# Patient Record
Sex: FEMALE | Race: WHITE | NOT HISPANIC OR LATINO | Employment: UNEMPLOYED | URBAN - NONMETROPOLITAN AREA
[De-identification: names, ages, dates, MRNs, and addresses within clinical notes are randomized per-mention and may not be internally consistent; named-entity substitution may affect disease eponyms.]

---

## 2017-01-04 ENCOUNTER — OUTSIDE FACILITY SERVICE (OUTPATIENT)
Dept: CARDIOLOGY | Facility: CLINIC | Age: 62
End: 2017-01-04

## 2017-01-04 ENCOUNTER — HOSPITAL ENCOUNTER (OUTPATIENT)
Dept: CARDIOLOGY | Facility: HOSPITAL | Age: 62
Discharge: HOME | End: 2017-01-04

## 2017-01-04 DIAGNOSIS — R06.02 SHORTNESS OF BREATH: ICD-10-CM

## 2017-01-04 DIAGNOSIS — R07.89 OTHER CHEST PAIN: ICD-10-CM

## 2017-01-04 DIAGNOSIS — I48.91 ATRIAL FIBRILLATION, UNSPECIFIED TYPE (HCC): ICD-10-CM

## 2017-01-04 DIAGNOSIS — I10 ESSENTIAL HYPERTENSION: ICD-10-CM

## 2017-01-04 LAB
MAXIMAL PREDICTED HEART RATE: 161 BPM
STRESS TARGET HR: 137 BPM

## 2017-01-04 PROCEDURE — 93018 CV STRESS TEST I&R ONLY: CPT | Performed by: INTERNAL MEDICINE

## 2017-01-04 PROCEDURE — A9500 TC99M SESTAMIBI: HCPCS | Performed by: INTERNAL MEDICINE

## 2017-01-04 PROCEDURE — 0 TECHNETIUM SESTAMIBI: Performed by: INTERNAL MEDICINE

## 2017-01-04 PROCEDURE — 93017 CV STRESS TEST TRACING ONLY: CPT

## 2017-01-04 PROCEDURE — 78452 HT MUSCLE IMAGE SPECT MULT: CPT

## 2017-01-04 PROCEDURE — 25010000002 REGADENOSON 0.4 MG/5ML SOLUTION: Performed by: INTERNAL MEDICINE

## 2017-01-04 PROCEDURE — 78452 HT MUSCLE IMAGE SPECT MULT: CPT | Performed by: INTERNAL MEDICINE

## 2017-01-04 RX ADMIN — Medication 1 DOSE: at 14:00

## 2017-01-04 RX ADMIN — REGADENOSON 0.4 MG: 0.08 INJECTION, SOLUTION INTRAVENOUS at 14:00

## 2017-01-25 ENCOUNTER — OFFICE VISIT (OUTPATIENT)
Dept: CARDIOLOGY | Facility: CLINIC | Age: 62
End: 2017-01-25

## 2017-01-25 VITALS
HEIGHT: 61 IN | HEART RATE: 85 BPM | DIASTOLIC BLOOD PRESSURE: 60 MMHG | WEIGHT: 227.6 LBS | SYSTOLIC BLOOD PRESSURE: 116 MMHG | BODY MASS INDEX: 42.97 KG/M2 | OXYGEN SATURATION: 97 %

## 2017-01-25 DIAGNOSIS — I48.0 PAROXYSMAL ATRIAL FIBRILLATION (HCC): Primary | ICD-10-CM

## 2017-01-25 DIAGNOSIS — I10 ESSENTIAL HYPERTENSION: ICD-10-CM

## 2017-01-25 DIAGNOSIS — R60.9 PERIPHERAL EDEMA: ICD-10-CM

## 2017-01-25 DIAGNOSIS — I27.20 MODERATE TO SEVERE PULMONARY HYPERTENSION (HCC): ICD-10-CM

## 2017-01-25 DIAGNOSIS — Z00.00 HEALTHCARE MAINTENANCE: ICD-10-CM

## 2017-01-25 PROCEDURE — 99213 OFFICE O/P EST LOW 20 MIN: CPT | Performed by: NURSE PRACTITIONER

## 2017-01-25 RX ORDER — FUROSEMIDE 40 MG/1
40 TABLET ORAL DAILY
Qty: 30 TABLET | Refills: 5 | Status: SHIPPED | OUTPATIENT
Start: 2017-01-25 | End: 2017-03-09 | Stop reason: SDUPTHER

## 2017-01-25 NOTE — PROGRESS NOTES
Subjective   Christina Holbrook is a 61 y.o. female     Chief Complaint   Patient presents with   • Follow-up     presents as a follow up       HPI    Problem List:    1. Persistent Afib (CHADS 2 - Eliquis)  2. Hypertension  2.1 Echo 5/3/16 - EF 60-65%; trace MR; mild TR; PA 58  2.2 Echo 10/6/16 - EF 65%; mild TR; PA 35-45  3. Pulmonary Hypertension  4. Chronic BLE Edema   5. H/O CHF   6. Chest Pain  6.1 Stress Test 5/3/16 - small fixed defect in mid anterolateral, apical lateral segments; extent of defect mild to mod; post stress EF 40%  6.2 Stress Test 1/4/17 - no ischemia; low risk  7. DEEPAK - CPAP     Patient is a 61-year-old female who presents today for a follow-up on test results.  She denies any chest pain, pressure, palpitations, fluttering, dizziness, presyncope, syncope, orthopnea or PND.  Edema is much better.  She says she does not get short of breath with her daily.  She says since she has been on CPAP she has felt so much better.  She has had some blood in urine at times.  Advised to follow-up with PCP.  She has not had any other signs of bleeding or cerebral ischemic events.     We went over stress test.     Current Outpatient Prescriptions   Medication Sig Dispense Refill   • apixaban (ELIQUIS) 5 MG tablet tablet Take 5 mg by mouth 2 (two) times a day.     • furosemide (LASIX) 40 MG tablet Take 1 tablet by mouth Daily. 30 tablet 5   • hydrochlorothiazide (HYDRODIURIL) 12.5 MG tablet Take 12.5 mg by mouth Daily.     • metoprolol tartrate (LOPRESSOR) 50 MG tablet Take 1 tablet by mouth Every 12 (Twelve) Hours. 60 tablet 5   • potassium chloride (K-DUR) 10 MEQ CR tablet Take 1 tablet by mouth Daily. 30 tablet 3   • spironolactone (ALDACTONE) 25 MG tablet Take 1 tablet by mouth Daily. 30 tablet 6   • tamsulosin (FLOMAX) 0.4 MG capsule 24 hr capsule Take 1 capsule by mouth Daily. 30 capsule 5     No current facility-administered medications for this visit.        ALLERGIES    Naproxen    Past Medical  "History   Diagnosis Date   • A-fib    • CHF (congestive heart failure)    • Diabetes mellitus    • Disease of thyroid gland    • DVT (deep venous thrombosis)      hx   • Pulmonary HTN      severe       Social History     Social History   • Marital status:      Spouse name: N/A   • Number of children: N/A   • Years of education: N/A     Occupational History   • Not on file.     Social History Main Topics   • Smoking status: Former Smoker     Packs/day: 1.00     Types: Cigarettes     Quit date: 1993   • Smokeless tobacco: Not on file   • Alcohol use No   • Drug use: No   • Sexual activity: Defer     Other Topics Concern   • Not on file     Social History Narrative       Family History   Problem Relation Age of Onset   • Heart disease Father    • Heart disease Sister    • Heart disease Brother        Review of Systems   Constitutional: Negative for diaphoresis and fatigue.   HENT: Negative for rhinorrhea and sneezing.    Eyes: Negative for visual disturbance (implants ).   Respiratory: Negative for chest tightness and shortness of breath.    Cardiovascular: Negative for chest pain and palpitations.   Gastrointestinal: Negative.  Negative for nausea and vomiting.   Endocrine: Negative.    Genitourinary: Positive for difficulty urinating.        Some blood in urine    Musculoskeletal: Positive for arthralgias. Negative for back pain and neck pain.   Skin: Negative.    Allergic/Immunologic: Negative.    Neurological: Negative for dizziness, syncope and light-headedness.   Hematological: Bruises/bleeds easily.   Psychiatric/Behavioral: Negative.        Objective   Visit Vitals   • /60 (BP Location: Left arm, Patient Position: Sitting)   • Pulse 85   • Ht 61\" (154.9 cm)   • Wt 227 lb 9.6 oz (103 kg)   • SpO2 97%   • BMI 43 kg/m2     Lab Results (most recent)     None        Physical Exam   Constitutional: She is oriented to person, place, and time. Vital signs are normal. She appears well-developed and " well-nourished. She is active and cooperative.   HENT:   Head: Normocephalic.   Mouth/Throat: Abnormal dentition (missing some teeth).   Eyes: Lids are normal.   Neck: Normal carotid pulses, no hepatojugular reflux and no JVD present. Carotid bruit is present (Lt bruit ).   Cardiovascular: Normal rate.  An irregularly irregular rhythm present.   Murmur heard.   Systolic (RUSB and LUSB 1-2/6) murmur is present   Pulses:       Radial pulses are 2+ on the right side, and 2+ on the left side.        Dorsalis pedis pulses are 2+ on the right side, and 1+ on the left side.        Posterior tibial pulses are 2+ on the right side, and 1+ on the left side.   Trace edema BLE.    Pulmonary/Chest: Effort normal and breath sounds normal.   Abdominal: Normal appearance.   Neurological: She is alert and oriented to person, place, and time.   Skin: Skin is warm, dry and intact.   Purple discoloration to finger tips    Psychiatric: She has a normal mood and affect. Her speech is normal and behavior is normal. Judgment and thought content normal. Cognition and memory are normal.       Procedure   Procedures         Assessment/Plan      Diagnosis Plan   1. Paroxysmal atrial fibrillation     2. Severe pulmonary hypertension noted on recent echo Doppler study with right ventricular systolic pressures of 58mmHg.      3. Essential hypertension  Basic Metabolic Panel   4. Peripheral edema  furosemide (LASIX) 40 MG tablet    Basic Metabolic Panel   5. Healthcare maintenance  Basic Metabolic Panel       Return in about 6 weeks (around 3/8/2017).    Patient is doing much better from a cardiac standpoint.  She will decrease her Lasix to 40 mg and continue her medication regimen otherwise.  She will get a BMP in 1 week.  She will follow-up in 6 weeks or sooner if any changes.  She will follow-up with PCP if she notices blood in her urine.

## 2017-01-25 NOTE — PATIENT INSTRUCTIONS
Edema  Edema is an abnormal buildup of fluids in your body tissues. Edema is somewhat dependent on gravity to pull the fluid to the lowest place in your body. That makes the condition more common in the legs and thighs (lower extremities). Painless swelling of the feet and ankles is common and becomes more likely as you get older. It is also common in looser tissues, like around your eyes.   When the affected area is squeezed, the fluid may move out of that spot and leave a dent for a few moments. This dent is called pitting.   CAUSES   There are many possible causes of edema. Eating too much salt and being on your feet or sitting for a long time can cause edema in your legs and ankles. Hot weather may make edema worse. Common medical causes of edema include:  · Heart failure.  · Liver disease.  · Kidney disease.  · Weak blood vessels in your legs.  · Cancer.  · An injury.  · Pregnancy.  · Some medications.  · Obesity.   SYMPTOMS   Edema is usually painless. Your skin may look swollen or shiny.   DIAGNOSIS   Your health care provider may be able to diagnose edema by asking about your medical history and doing a physical exam. You may need to have tests such as X-rays, an electrocardiogram, or blood tests to check for medical conditions that may cause edema.   TREATMENT   Edema treatment depends on the cause. If you have heart, liver, or kidney disease, you need the treatment appropriate for these conditions. General treatment may include:  · Elevation of the affected body part above the level of your heart.  · Compression of the affected body part. Pressure from elastic bandages or support stockings squeezes the tissues and forces fluid back into the blood vessels. This keeps fluid from entering the tissues.  · Restriction of fluid and salt intake.  · Use of a water pill (diuretic). These medications are appropriate only for some types of edema. They pull fluid out of your body and make you urinate more often. This  gets rid of fluid and reduces swelling, but diuretics can have side effects. Only use diuretics as directed by your health care provider.  HOME CARE INSTRUCTIONS   · Keep the affected body part above the level of your heart when you are lying down.    · Do not sit still or stand for prolonged periods.    · Do not put anything directly under your knees when lying down.  · Do not wear constricting clothing or garters on your upper legs.    · Exercise your legs to work the fluid back into your blood vessels. This may help the swelling go down.    · Wear elastic bandages or support stockings to reduce ankle swelling as directed by your health care provider.    · Eat a low-salt diet to reduce fluid if your health care provider recommends it.    · Only take medicines as directed by your health care provider.   SEEK MEDICAL CARE IF:   · Your edema is not responding to treatment.  · You have heart, liver, or kidney disease and notice symptoms of edema.  · You have edema in your legs that does not improve after elevating them.    · You have sudden and unexplained weight gain.  SEEK IMMEDIATE MEDICAL CARE IF:   · You develop shortness of breath or chest pain.    · You cannot breathe when you lie down.  · You develop pain, redness, or warmth in the swollen areas.    · You have heart, liver, or kidney disease and suddenly get edema.  · You have a fever and your symptoms suddenly get worse.  MAKE SURE YOU:   · Understand these instructions.  · Will watch your condition.  · Will get help right away if you are not doing well or get worse.     This information is not intended to replace advice given to you by your health care provider. Make sure you discuss any questions you have with your health care provider.     Document Released: 12/18/2006 Document Revised: 01/08/2016 Document Reviewed: 10/10/2014  [employer] Interactive Patient Education ©2016 [employer]  Atrial Fibrillation  Atrial fibrillation is a type of irregular or  rapid heartbeat (arrhythmia). In atrial fibrillation, the heart quivers continuously in a chaotic pattern. This occurs when parts of the heart receive disorganized signals that make the heart unable to pump blood normally. This can increase the risk for stroke, heart failure, and other heart-related conditions. There are different types of atrial fibrillation, including:  · Paroxysmal atrial fibrillation. This type starts suddenly, and it usually stops on its own shortly after it starts.  · Persistent atrial fibrillation. This type often lasts longer than a week. It may stop on its own or with treatment.  · Long-lasting persistent atrial fibrillation. This type lasts longer than 12 months.  · Permanent atrial fibrillation. This type does not go away.  Talk with your health care provider to learn about the type of atrial fibrillation that you have.  CAUSES  This condition is caused by some heart-related conditions or procedures, including:  · A heart attack.  · Coronary artery disease.  · Heart failure.  · Heart valve conditions.  · High blood pressure.  · Inflammation of the sac that surrounds the heart (pericarditis).  · Heart surgery.  · Certain heart rhythm disorders, such as Parrish-Parkinson-White syndrome.  Other causes include:  · Pneumonia.  · Obstructive sleep apnea.  · Blockage of an artery in the lungs (pulmonary embolism, or PE).  · Lung cancer.  · Chronic lung disease.  · Thyroid problems, especially if the thyroid is overactive (hyperthyroidism).  · Caffeine.  · Excessive alcohol use or illegal drug use.  · Use of some medicines, including certain decongestants and diet pills.  Sometimes, the cause cannot be found.  RISK FACTORS  This condition is more likely to develop in:  · People who are older in age.  · People who smoke.  · People who have diabetes mellitus.  · People who are overweight (obese).  · Athletes who exercise vigorously.  SYMPTOMS  Symptoms of this condition include:  · A feeling that your  heart is beating rapidly or irregularly.  · A feeling of discomfort or pain in your chest.  · Shortness of breath.  · Sudden light-headedness or weakness.  · Getting tired easily during exercise.  In some cases, there are no symptoms.  DIAGNOSIS  Your health care provider may be able to detect atrial fibrillation when taking your pulse. If detected, this condition may be diagnosed with:  · An electrocardiogram (ECG).  · A Holter monitor test that records your heartbeat patterns over a 24-hour period.  · Transthoracic echocardiogram (TTE) to evaluate how blood flows through your heart.  · Transesophageal echocardiogram (LEONILA) to view more detailed images of your heart.  · A stress test.  · Imaging tests, such as a CT scan or chest X-ray.  · Blood tests.  TREATMENT  The main goals of treatment are to prevent blood clots from forming and to keep your heart beating at a normal rate and rhythm. The type of treatment that you receive depends on many factors, such as your underlying medical conditions and how you feel when you are experiencing atrial fibrillation.  This condition may be treated with:  · Medicine to slow down the heart rate, bring the heart's rhythm back to normal, or prevent clots from forming.  · Electrical cardioversion. This is a procedure that resets your heart's rhythm by delivering a controlled, low-energy shock to the heart through your skin.  · Different types of ablation, such as catheter ablation, catheter ablation with pacemaker, or surgical ablation. These procedures destroy the heart tissues that send abnormal signals. When the pacemaker is used, it is placed under your skin to help your heart beat in a regular rhythm.  HOME CARE INSTRUCTIONS  · Take over-the counter and prescription medicines only as told by your health care provider.  · If your health care provider prescribed a blood-thinning medicine (anticoagulant), take it exactly as told. Taking too much blood-thinning medicine can cause  bleeding. If you do not take enough blood-thinning medicine, you will not have the protection that you need against stroke and other problems.  · Do not use tobacco products, including cigarettes, chewing tobacco, and e-cigarettes. If you need help quitting, ask your health care provider.  · If you have obstructive sleep apnea, manage your condition as told by your health care provider.  · Do not drink alcohol.  · Do not drink beverages that contain caffeine, such as coffee, soda, and tea.  · Maintain a healthy weight. Do not use diet pills unless your health care provider approves. Diet pills may make heart problems worse.  · Follow diet instructions as told by your health care provider.  · Exercise regularly as told by your health care provider.  · Keep all follow-up visits as told by your health care provider. This is important.  PREVENTION  · Avoid drinking beverages that contain caffeine or alcohol.  · Avoid certain medicines, especially medicines that are used for breathing problems.  · Avoid certain herbs and herbal medicines, such as those that contain ephedra or ginseng.  · Do not use illegal drugs, such as cocaine and amphetamines.  · Do not smoke.  · Manage your high blood pressure.  SEEK MEDICAL CARE IF:  · You notice a change in the rate, rhythm, or strength of your heartbeat.  · You are taking an anticoagulant and you notice increased bruising.  · You tire more easily when you exercise or exert yourself.  SEEK IMMEDIATE MEDICAL CARE IF:  · You have chest pain, abdominal pain, sweating, or weakness.  · You feel nauseous.  · You notice blood in your vomit, bowel movement, or urine.  · You have shortness of breath.  · You suddenly have swollen feet and ankles.  · You feel dizzy.  · You have sudden weakness or numbness of the face, arm, or leg, especially on one side of the body.  · You have trouble speaking, trouble understanding, or both (aphasia).  · Your face or your eyelid droops on one side.  These  symptoms may represent a serious problem that is an emergency. Do not wait to see if the symptoms will go away. Get medical help right away. Call your local emergency services (911 in the [place] Do not drive yourself to the hospital.     This information is not intended to replace advice given to you by your health care provider. Make sure you discuss any questions you have with your health care provider.     Document Released: 12/18/2006 Document Revised: 09/07/2016 Document Reviewed: 04/13/2016  [employer] Interactive Patient Education ©2016 [employer]  Heart-Healthy Eating Plan  Many factors influence your heart health, including eating and exercise habits. Heart (coronary) risk increases with abnormal blood fat (lipid) levels. Heart-healthy meal planning includes limiting unhealthy fats, increasing healthy fats, and making other small dietary changes. This includes maintaining a healthy body weight to help keep lipid levels within a normal range.  WHAT IS MY PLAN?   Your health care provider recommends that you:  · Get no more than _________% of the total calories in your daily diet from fat.  · Limit your intake of saturated fat to less than _________% of your total calories each day.  · Limit the amount of cholesterol in your diet to less than _________ mg per day.  WHAT TYPES OF FAT SHOULD I CHOOSE?  · Choose healthy fats more often. Choose monounsaturated and polyunsaturated fats, such as olive oil and canola oil, flaxseeds, walnuts, almonds, and seeds.  · Eat more omega-3 fats. Good choices include salmon, mackerel, sardines, tuna, flaxseed oil, and ground flaxseeds. Aim to eat fish at least two times each week.  · Limit saturated fats. Saturated fats are primarily found in animal products, such as meats, butter, and cream. Plant sources of saturated fats include palm oil, palm kernel oil, and coconut oil.  · Avoid foods with partially hydrogenated oils in them. These contain trans fats. Examples of foods  "that contain trans fats are stick margarine, some tub margarines, cookies, crackers, and other baked goods.  WHAT GENERAL GUIDELINES DO I NEED TO FOLLOW?  · Check food labels carefully to identify foods with trans fats or high amounts of saturated fat.  · Fill one half of your plate with vegetables and green salads. Eat 4-5 servings of vegetables per day. A serving of vegetables equals 1 cup of raw leafy vegetables, ½ cup of raw or cooked cut-up vegetables, or ½ cup of vegetable juice.  · Fill one fourth of your plate with whole grains. Look for the word \"whole\" as the first word in the ingredient list.  · Fill one fourth of your plate with lean protein foods.  · Eat 4-5 servings of fruit per day. A serving of fruit equals one medium whole fruit, ¼ cup of dried fruit, ½ cup of fresh, frozen, or canned fruit, or ½ cup of 100% fruit juice.  · Eat more foods that contain soluble fiber. Examples of foods that contain this type of fiber are apples, broccoli, carrots, beans, peas, and barley. Aim to get 20-30 g of fiber per day.  · Eat more home-cooked food and less restaurant, buffet, and fast food.  · Limit or avoid alcohol.  · Limit foods that are high in starch and sugar.  · Avoid fried foods.  · Cook foods by using methods other than frying. Baking, boiling, grilling, and broiling are all great options. Other fat-reducing suggestions include:    Removing the skin from poultry.    Removing all visible fats from meats.    Skimming the fat off of stews, soups, and gravies before serving them.    Steaming vegetables in water or broth.  · Lose weight if you are overweight. Losing just 5-10% of your initial body weight can help your overall health and prevent diseases such as diabetes and heart disease.  · Increase your consumption of nuts, legumes, and seeds to 4-5 servings per week. One serving of dried beans or legumes equals ½ cup after being cooked, one serving of nuts equals 1½ ounces, and one serving of seeds equals " ½ ounce or 1 tablespoon.  · You may need to monitor your salt (sodium) intake, especially if you have high blood pressure. Talk with your health care provider or dietitian to get more information about reducing sodium.  WHAT FOODS CAN I EAT?  Grains  Breads, including Romanian, white, dexter, wheat, raisin, rye, oatmeal, and Italian. Tortillas that are neither fried nor made with lard or trans fat. Low-fat rolls, including hotdog and hamburger buns and English muffins. Biscuits. Muffins. Waffles. Pancakes. Light popcorn. Whole-grain cereals. Flatbread. [place] toast. Pretzels. Breadsticks. Rusks. Low-fat snacks and crackers, including oyster, saltine, matzo, pastora, animal, and rye. Rice and pasta, including brown rice and those that are made with whole wheat.  Vegetables  All vegetables.  Fruits  All fruits, but limit coconut.  Meats and Other Protein Sources  Lean, well-trimmed beef, veal, pork, and lamb. Chicken and turkey without skin. All fish and shellfish. Wild duck, rabbit, pheasant, and venison. Egg whites or low-cholesterol egg substitutes. Dried beans, peas, lentils, and tofu. Seeds and most nuts.  Dairy  Low-fat or nonfat cheeses, including ricotta, string, and mozzarella. Skim or 1% milk that is liquid, powdered, or evaporated. Buttermilk that is made with low-fat milk. Nonfat or low-fat yogurt.  Beverages  Mineral water. Diet carbonated beverages.  Sweets and Desserts  Sherbets and fruit ices. Honey, jam, marmalade, jelly, and syrups. Meringues and gelatins. Pure sugar candy, such as hard candy, jelly beans, gumdrops, mints, marshmallows, and small amounts of dark chocolate. Gerardo food cake.  Eat all sweets and desserts in moderation.  Fats and Oils  Nonhydrogenated (trans-free) margarines. Vegetable oils, including soybean, sesame, sunflower, olive, peanut, safflower, corn, canola, and cottonseed. Salad dressings or mayonnaise that are made with a vegetable oil. Limit added fats and oils that you use for  cooking, baking, salads, and as spreads.  Other  Cocoa powder. Coffee and tea. All seasonings and condiments.  The items listed above may not be a complete list of recommended foods or beverages. Contact your dietitian for more options.  WHAT FOODS ARE NOT RECOMMENDED?  Grains  Breads that are made with saturated or trans fats, oils, or whole milk. Croissants. Butter rolls. Cheese breads. Sweet rolls. Donuts. Buttered popcorn. Chow mein noodles. High-fat crackers, such as cheese or butter crackers.  Meats and Other Protein Sources  Fatty meats, such as hotdogs, short ribs, sausage, spareribs, maldonado, ribeye roast or steak, and mutton. High-fat deli meats, such as salami and bologna. Caviar. Domestic duck and goose. Organ meats, such as kidney, liver, sweetbreads, brains, gizzard, chitterlings, and heart.  Dairy  Cream, sour cream, cream cheese, and creamed cottage cheese. Whole milk cheeses, including blue (chino), [place] Luis Enrique, Brie, Jason, American, Havarti, Swiss, cheddar, Camembert, and Gilbert.  Whole or 2% milk that is liquid, evaporated, or condensed. Whole buttermilk. Cream sauce or high-fat cheese sauce. Yogurt that is made from whole milk.  Beverages  Regular sodas and drinks with added sugar.  Sweets and Desserts  Frosting. Pudding. Cookies. Cakes other than gena food cake. Candy that has milk chocolate or white chocolate, hydrogenated fat, butter, coconut, or unknown ingredients. Buttered syrups. Full-fat ice cream or ice cream drinks.  Fats and Oils  Gravy that has suet, meat fat, or shortening. Cocoa butter, hydrogenated oils, palm oil, coconut oil, palm kernel oil. These can often be found in baked products, candy, fried foods, nondairy creamers, and whipped toppings. Solid fats and shortenings, including maldonado fat, salt pork, lard, and butter. Nondairy cream substitutes, such as coffee creamers and sour cream substitutes. Salad dressings that are made of unknown oils, cheese, or sour cream.  The  items listed above may not be a complete list of foods and beverages to avoid. Contact your dietitian for more information.     This information is not intended to replace advice given to you by your health care provider. Make sure you discuss any questions you have with your health care provider.     Document Released: 09/26/2009 Document Revised: 01/08/2016 Document Reviewed: 06/11/2015  [employer] Interactive Patient Education ©2016 [employer]  Fat and Cholesterol Restricted Diet  High levels of fat and cholesterol in your blood may lead to various health problems, such as diseases of the heart, blood vessels, gallbladder, liver, and pancreas. Fats are concentrated sources of energy that come in various forms. Certain types of fat, including saturated fat, may be harmful in excess. Cholesterol is a substance needed by your body in small amounts. Your body makes all the cholesterol it needs. Excess cholesterol comes from the food you eat.  When you have high levels of cholesterol and saturated fat in your blood, health problems can develop because the excess fat and cholesterol will gather along the walls of your blood vessels, causing them to narrow. Choosing the right foods will help you control your intake of fat and cholesterol. This will help keep the levels of these substances in your blood within normal limits and reduce your risk of disease.  WHAT IS MY PLAN?  Your health care provider recommends that you:  · Get no more than __________ % of the total calories in your daily diet from fat.  · Limit your intake of saturated fat to less than ______% of your total calories each day.  · Limit the amount of cholesterol in your diet to less than _________mg per day.  WHAT TYPES OF FAT SHOULD I CHOOSE?  · Choose healthy fats more often. Choose monounsaturated and polyunsaturated fats, such as olive and canola oil, flaxseeds, walnuts, almonds, and seeds.  · Eat more omega-3 fats. Good choices include salmon,  "mackerel, sardines, tuna, flaxseed oil, and ground flaxseeds. Aim to eat fish at least two times a week.  · Limit saturated fats. Saturated fats are primarily found in animal products, such as meats, butter, and cream. Plant sources of saturated fats include palm oil, palm kernel oil, and coconut oil.  · Avoid foods with partially hydrogenated oils in them. These contain trans fats. Examples of foods that contain trans fats are stick margarine, some tub margarines, cookies, crackers, and other baked goods.  WHAT GENERAL GUIDELINES DO I NEED TO FOLLOW?  These guidelines for healthy eating will help you control your intake of fat and cholesterol:  · Check food labels carefully to identify foods with trans fats or high amounts of saturated fat.  · Fill one half of your plate with vegetables and green salads.  · Fill one fourth of your plate with whole grains. Look for the word \"whole\" as the first word in the ingredient list.  · Fill one fourth of your plate with lean protein foods.  · Limit fruit to two servings a day. Choose fruit instead of juice.  · Eat more foods that contain soluble fiber. Examples of foods that contain this type of fiber are apples, broccoli, carrots, beans, peas, and barley. Aim to get 20-30 g of fiber per day.  · Eat more home-cooked food and less restaurant, buffet, and fast food.  · Limit or avoid alcohol.  · Limit foods high in starch and sugar.  · Limit fried foods.  · Cook foods using methods other than frying. Baking, boiling, grilling, and broiling are all great options.  · Lose weight if you are overweight. Losing just 5-10% of your initial body weight can help your overall health and prevent diseases such as diabetes and heart disease.  WHAT FOODS CAN I EAT?  Grains  Whole grains, such as whole wheat or whole grain breads, crackers, cereals, and pasta. Unsweetened oatmeal, bulgur, barley, quinoa, or brown rice. Corn or whole wheat flour tortillas.  Vegetables  Fresh or frozen " vegetables (raw, steamed, roasted, or grilled). Green salads.  Fruits  All fresh, canned (in natural juice), or frozen fruits.  Meat and Other Protein Products  Ground beef (85% or leaner), grass-fed beef, or beef trimmed of fat. Skinless chicken or turkey. Ground chicken or turkey. Pork trimmed of fat. All fish and seafood. Eggs. Dried beans, peas, or lentils. Unsalted nuts or seeds. Unsalted canned or dry beans.  Dairy  Low-fat dairy products, such as skim or 1% milk, 2% or reduced-fat cheeses, low-fat ricotta or cottage cheese, or plain low-fat yogurt.  Fats and Oils  Tub margarines without trans fats. Light or reduced-fat mayonnaise and salad dressings. Avocado. Olive, canola, sesame, or safflower oils. Natural peanut or almond butter (choose ones without added sugar and oil).  The items listed above may not be a complete list of recommended foods or beverages. Contact your dietitian for more options.  WHAT FOODS ARE NOT RECOMMENDED?  Grains  White bread. White pasta. White rice. Cornbread. Bagels, pastries, and croissants. Crackers that contain trans fat.  Vegetables  White potatoes. Corn. Creamed or fried vegetables. Vegetables in a cheese sauce.  Fruits  Dried fruits. Canned fruit in light or heavy syrup. Fruit juice.  Meat and Other Protein Products  Fatty cuts of meat. Ribs, chicken wings, maldonado, sausage, bologna, salami, chitterlings, fatback, hot dogs, bratwurst, and packaged luncheon meats. Liver and organ meats.  Dairy  Whole or 2% milk, cream, half-and-half, and cream cheese. Whole milk cheeses. Whole-fat or sweetened yogurt. Full-fat cheeses. Nondairy creamers and whipped toppings. Processed cheese, cheese spreads, or cheese curds.  Sweets and Desserts  Corn syrup, sugars, honey, and molasses. Candy. Jam and jelly. Syrup. Sweetened cereals. Cookies, pies, cakes, donuts, muffins, and ice cream.  Fats and Oils  Butter, stick margarine, lard, shortening, ghee, or maldonado fat. Coconut, palm kernel, or palm  oils.  Beverages  Alcohol. Sweetened drinks (such as sodas, lemonade, and fruit drinks or punches).  The items listed above may not be a complete list of foods and beverages to avoid. Contact your dietitian for more information.     This information is not intended to replace advice given to you by your health care provider. Make sure you discuss any questions you have with your health care provider.     Document Released: 12/18/2006 Document Revised: 01/08/2016 Document Reviewed: 03/18/2015  [employer] Interactive Patient Education ©2016 [email or web address]

## 2017-01-25 NOTE — MR AVS SNAPSHOT
Christina A Sheron   1/25/2017 8:30 AM   Office Visit    Dept Phone:  [phone]   Encounter #:  [id number]    Provider:  CACHORRO Perera   Department:  [facility] CARDIOLOGY                Your Full Care Plan              Today's Medication Changes          These changes are accurate as of: 1/25/17  8:56 AM.  If you have any questions, ask your nurse or doctor.               Medication(s)that have changed:     furosemide 40 MG tablet   Commonly known as:  LASIX   Take 1 tablet by mouth Daily.   What changed:    - medication strength  - how much to take   Changed by:  CACHORRO Perera         Stop taking medication(s)listed here:     fluticasone 50 MCG/ACT nasal spray   Commonly known as:  FLONASE   Stopped by:  CACHORRO Perera                Where to Get Your Medications      These medications were sent to [facility] [address] [place] [address] - [address] [place] [address] [phone] [facility] [phone] [address] [address]     Phone:  [phone]     furosemide 40 MG tablet                  Your Updated Medication List          This list is accurate as of: 1/25/17  8:56 AM.  Always use your most recent med list.                apixaban 5 MG tablet tablet   Commonly known as:  ELIQUIS       furosemide 40 MG tablet   Commonly known as:  LASIX   Take 1 tablet by mouth Daily.       hydrochlorothiazide 12.5 MG tablet   Commonly known as:  HYDRODIURIL       metoprolol tartrate 50 MG tablet   Commonly known as:  LOPRESSOR   Take 1 tablet by mouth Every 12 (Twelve) Hours.       potassium chloride 10 MEQ CR tablet   Commonly known as:  K-DUR   Take 1 tablet by mouth Daily.       spironolactone 25 MG tablet   Commonly known as:  ALDACTONE   Take 1 tablet by mouth Daily.       tamsulosin 0.4 MG capsule 24 hr capsule   Commonly known as:  FLOMAX   Take 1 capsule by mouth Daily.               You Were Diagnosed With        Codes Comments    Paroxysmal atrial fibrillation    -  Primary ICD-10-CM: I48.0  ICD-9-CM: 427.31     Moderate to severe pulmonary hypertension     ICD-10-CM: I27.2  ICD-9-CM: 416.8     Essential hypertension     ICD-10-CM: I10  ICD-9-CM: 401.9     Peripheral edema     ICD-10-CM: R60.9  ICD-9-CM: 782.3     Healthcare maintenance     ICD-10-CM: Z00.00  ICD-9-CM: V70.0       Instructions    Edema  Edema is an abnormal buildup of fluids in your body tissues. Edema is somewhat dependent on gravity to pull the fluid to the lowest place in your body. That makes the condition more common in the legs and thighs (lower extremities). Painless swelling of the feet and ankles is common and becomes more likely as you get older. It is also common in looser tissues, like around your eyes.   When the affected area is squeezed, the fluid may move out of that spot and leave a dent for a few moments. This dent is called pitting.   CAUSES   There are many possible causes of edema. Eating too much salt and being on your feet or sitting for a long time can cause edema in your legs and ankles. Hot weather may make edema worse. Common medical causes of edema include:  · Heart failure.  · Liver disease.  · Kidney disease.  · Weak blood vessels in your legs.  · Cancer.  · An injury.  · Pregnancy.  · Some medications.  · Obesity.   SYMPTOMS   Edema is usually painless. Your skin may look swollen or shiny.   DIAGNOSIS   Your health care provider may be able to diagnose edema by asking about your medical history and doing a physical exam. You may need to have tests such as X-rays, an electrocardiogram, or blood tests to check for medical conditions that may cause edema.   TREATMENT   Edema treatment depends on the cause. If you have heart, liver, or kidney disease, you need the treatment appropriate for these conditions. General treatment may include:  · Elevation of the affected body part above the level of your heart.  · Compression of the affected body part.  Pressure from elastic bandages or support stockings squeezes the tissues and forces fluid back into the blood vessels. This keeps fluid from entering the tissues.  · Restriction of fluid and salt intake.  · Use of a water pill (diuretic). These medications are appropriate only for some types of edema. They pull fluid out of your body and make you urinate more often. This gets rid of fluid and reduces swelling, but diuretics can have side effects. Only use diuretics as directed by your health care provider.  HOME CARE INSTRUCTIONS   · Keep the affected body part above the level of your heart when you are lying down.    · Do not sit still or stand for prolonged periods.    · Do not put anything directly under your knees when lying down.  · Do not wear constricting clothing or garters on your upper legs.    · Exercise your legs to work the fluid back into your blood vessels. This may help the swelling go down.    · Wear elastic bandages or support stockings to reduce ankle swelling as directed by your health care provider.    · Eat a low-salt diet to reduce fluid if your health care provider recommends it.    · Only take medicines as directed by your health care provider.   SEEK MEDICAL CARE IF:   · Your edema is not responding to treatment.  · You have heart, liver, or kidney disease and notice symptoms of edema.  · You have edema in your legs that does not improve after elevating them.    · You have sudden and unexplained weight gain.  SEEK IMMEDIATE MEDICAL CARE IF:   · You develop shortness of breath or chest pain.    · You cannot breathe when you lie down.  · You develop pain, redness, or warmth in the swollen areas.    · You have heart, liver, or kidney disease and suddenly get edema.  · You have a fever and your symptoms suddenly get worse.  MAKE SURE YOU:   · Understand these instructions.  · Will watch your condition.  · Will get help right away if you are not doing well or get worse.     This information is  not intended to replace advice given to you by your health care provider. Make sure you discuss any questions you have with your health care provider.     Document Released: 12/18/2006 Document Revised: 01/08/2016 Document Reviewed: 10/10/2014  [employer] Interactive Patient Education ©2016 [employer]  Atrial Fibrillation  Atrial fibrillation is a type of irregular or rapid heartbeat (arrhythmia). In atrial fibrillation, the heart quivers continuously in a chaotic pattern. This occurs when parts of the heart receive disorganized signals that make the heart unable to pump blood normally. This can increase the risk for stroke, heart failure, and other heart-related conditions. There are different types of atrial fibrillation, including:  · Paroxysmal atrial fibrillation. This type starts suddenly, and it usually stops on its own shortly after it starts.  · Persistent atrial fibrillation. This type often lasts longer than a week. It may stop on its own or with treatment.  · Long-lasting persistent atrial fibrillation. This type lasts longer than 12 months.  · Permanent atrial fibrillation. This type does not go away.  Talk with your health care provider to learn about the type of atrial fibrillation that you have.  CAUSES  This condition is caused by some heart-related conditions or procedures, including:  · A heart attack.  · Coronary artery disease.  · Heart failure.  · Heart valve conditions.  · High blood pressure.  · Inflammation of the sac that surrounds the heart (pericarditis).  · Heart surgery.  · Certain heart rhythm disorders, such as Parrish-Parkinson-White syndrome.  Other causes include:  · Pneumonia.  · Obstructive sleep apnea.  · Blockage of an artery in the lungs (pulmonary embolism, or PE).  · Lung cancer.  · Chronic lung disease.  · Thyroid problems, especially if the thyroid is overactive (hyperthyroidism).  · Caffeine.  · Excessive alcohol use or illegal drug use.  · Use of some medicines, including  certain decongestants and diet pills.  Sometimes, the cause cannot be found.  RISK FACTORS  This condition is more likely to develop in:  · People who are older in age.  · People who smoke.  · People who have diabetes mellitus.  · People who are overweight (obese).  · Athletes who exercise vigorously.  SYMPTOMS  Symptoms of this condition include:  · A feeling that your heart is beating rapidly or irregularly.  · A feeling of discomfort or pain in your chest.  · Shortness of breath.  · Sudden light-headedness or weakness.  · Getting tired easily during exercise.  In some cases, there are no symptoms.  DIAGNOSIS  Your health care provider may be able to detect atrial fibrillation when taking your pulse. If detected, this condition may be diagnosed with:  · An electrocardiogram (ECG).  · A Holter monitor test that records your heartbeat patterns over a 24-hour period.  · Transthoracic echocardiogram (TTE) to evaluate how blood flows through your heart.  · Transesophageal echocardiogram (LEONILA) to view more detailed images of your heart.  · A stress test.  · Imaging tests, such as a CT scan or chest X-ray.  · Blood tests.  TREATMENT  The main goals of treatment are to prevent blood clots from forming and to keep your heart beating at a normal rate and rhythm. The type of treatment that you receive depends on many factors, such as your underlying medical conditions and how you feel when you are experiencing atrial fibrillation.  This condition may be treated with:  · Medicine to slow down the heart rate, bring the heart's rhythm back to normal, or prevent clots from forming.  · Electrical cardioversion. This is a procedure that resets your heart's rhythm by delivering a controlled, low-energy shock to the heart through your skin.  · Different types of ablation, such as catheter ablation, catheter ablation with pacemaker, or surgical ablation. These procedures destroy the heart tissues that send abnormal signals. When the  pacemaker is used, it is placed under your skin to help your heart beat in a regular rhythm.  HOME CARE INSTRUCTIONS  · Take over-the counter and prescription medicines only as told by your health care provider.  · If your health care provider prescribed a blood-thinning medicine (anticoagulant), take it exactly as told. Taking too much blood-thinning medicine can cause bleeding. If you do not take enough blood-thinning medicine, you will not have the protection that you need against stroke and other problems.  · Do not use tobacco products, including cigarettes, chewing tobacco, and e-cigarettes. If you need help quitting, ask your health care provider.  · If you have obstructive sleep apnea, manage your condition as told by your health care provider.  · Do not drink alcohol.  · Do not drink beverages that contain caffeine, such as coffee, soda, and tea.  · Maintain a healthy weight. Do not use diet pills unless your health care provider approves. Diet pills may make heart problems worse.  · Follow diet instructions as told by your health care provider.  · Exercise regularly as told by your health care provider.  · Keep all follow-up visits as told by your health care provider. This is important.  PREVENTION  · Avoid drinking beverages that contain caffeine or alcohol.  · Avoid certain medicines, especially medicines that are used for breathing problems.  · Avoid certain herbs and herbal medicines, such as those that contain ephedra or ginseng.  · Do not use illegal drugs, such as cocaine and amphetamines.  · Do not smoke.  · Manage your high blood pressure.  SEEK MEDICAL CARE IF:  · You notice a change in the rate, rhythm, or strength of your heartbeat.  · You are taking an anticoagulant and you notice increased bruising.  · You tire more easily when you exercise or exert yourself.  SEEK IMMEDIATE MEDICAL CARE IF:  · You have chest pain, abdominal pain, sweating, or weakness.  · You feel nauseous.  · You notice  blood in your vomit, bowel movement, or urine.  · You have shortness of breath.  · You suddenly have swollen feet and ankles.  · You feel dizzy.  · You have sudden weakness or numbness of the face, arm, or leg, especially on one side of the body.  · You have trouble speaking, trouble understanding, or both (aphasia).  · Your face or your eyelid droops on one side.  These symptoms may represent a serious problem that is an emergency. Do not wait to see if the symptoms will go away. Get medical help right away. Call your local emergency services (911 in the [place] Do not drive yourself to the hospital.     This information is not intended to replace advice given to you by your health care provider. Make sure you discuss any questions you have with your health care provider.     Document Released: 12/18/2006 Document Revised: 09/07/2016 Document Reviewed: 04/13/2016  [employer] Interactive Patient Education ©2016 [employer]  Heart-Healthy Eating Plan  Many factors influence your heart health, including eating and exercise habits. Heart (coronary) risk increases with abnormal blood fat (lipid) levels. Heart-healthy meal planning includes limiting unhealthy fats, increasing healthy fats, and making other small dietary changes. This includes maintaining a healthy body weight to help keep lipid levels within a normal range.  WHAT IS MY PLAN?   Your health care provider recommends that you:  · Get no more than _________% of the total calories in your daily diet from fat.  · Limit your intake of saturated fat to less than _________% of your total calories each day.  · Limit the amount of cholesterol in your diet to less than _________ mg per day.  WHAT TYPES OF FAT SHOULD I CHOOSE?  · Choose healthy fats more often. Choose monounsaturated and polyunsaturated fats, such as olive oil and canola oil, flaxseeds, walnuts, almonds, and seeds.  · Eat more omega-3 fats. Good choices include salmon, mackerel, sardines, tuna,  "flaxseed oil, and ground flaxseeds. Aim to eat fish at least two times each week.  · Limit saturated fats. Saturated fats are primarily found in animal products, such as meats, butter, and cream. Plant sources of saturated fats include palm oil, palm kernel oil, and coconut oil.  · Avoid foods with partially hydrogenated oils in them. These contain trans fats. Examples of foods that contain trans fats are stick margarine, some tub margarines, cookies, crackers, and other baked goods.  WHAT GENERAL GUIDELINES DO I NEED TO FOLLOW?  · Check food labels carefully to identify foods with trans fats or high amounts of saturated fat.  · Fill one half of your plate with vegetables and green salads. Eat 4-5 servings of vegetables per day. A serving of vegetables equals 1 cup of raw leafy vegetables, ½ cup of raw or cooked cut-up vegetables, or ½ cup of vegetable juice.  · Fill one fourth of your plate with whole grains. Look for the word \"whole\" as the first word in the ingredient list.  · Fill one fourth of your plate with lean protein foods.  · Eat 4-5 servings of fruit per day. A serving of fruit equals one medium whole fruit, ¼ cup of dried fruit, ½ cup of fresh, frozen, or canned fruit, or ½ cup of 100% fruit juice.  · Eat more foods that contain soluble fiber. Examples of foods that contain this type of fiber are apples, broccoli, carrots, beans, peas, and barley. Aim to get 20-30 g of fiber per day.  · Eat more home-cooked food and less restaurant, buffet, and fast food.  · Limit or avoid alcohol.  · Limit foods that are high in starch and sugar.  · Avoid fried foods.  · Cook foods by using methods other than frying. Baking, boiling, grilling, and broiling are all great options. Other fat-reducing suggestions include:    Removing the skin from poultry.    Removing all visible fats from meats.    Skimming the fat off of stews, soups, and gravies before serving them.    Steaming vegetables in water or broth.  · Lose " weight if you are overweight. Losing just 5-10% of your initial body weight can help your overall health and prevent diseases such as diabetes and heart disease.  · Increase your consumption of nuts, legumes, and seeds to 4-5 servings per week. One serving of dried beans or legumes equals ½ cup after being cooked, one serving of nuts equals 1½ ounces, and one serving of seeds equals ½ ounce or 1 tablespoon.  · You may need to monitor your salt (sodium) intake, especially if you have high blood pressure. Talk with your health care provider or dietitian to get more information about reducing sodium.  WHAT FOODS CAN I EAT?  Grains  Breads, including Jordanian, white, dexter, wheat, raisin, rye, oatmeal, and Italian. Tortillas that are neither fried nor made with lard or trans fat. Low-fat rolls, including hotdog and hamburger buns and English muffins. Biscuits. Muffins. Waffles. Pancakes. Light popcorn. Whole-grain cereals. Flatbread. [place] toast. Pretzels. Breadsticks. Rusks. Low-fat snacks and crackers, including oyster, saltine, matzo, pastora, animal, and rye. Rice and pasta, including brown rice and those that are made with whole wheat.  Vegetables  All vegetables.  Fruits  All fruits, but limit coconut.  Meats and Other Protein Sources  Lean, well-trimmed beef, veal, pork, and lamb. Chicken and turkey without skin. All fish and shellfish. Wild duck, rabbit, pheasant, and venison. Egg whites or low-cholesterol egg substitutes. Dried beans, peas, lentils, and tofu. Seeds and most nuts.  Dairy  Low-fat or nonfat cheeses, including ricotta, string, and mozzarella. Skim or 1% milk that is liquid, powdered, or evaporated. Buttermilk that is made with low-fat milk. Nonfat or low-fat yogurt.  Beverages  Mineral water. Diet carbonated beverages.  Sweets and Desserts  Sherbets and fruit ices. Honey, jam, marmalade, jelly, and syrups. Meringues and gelatins. Pure sugar candy, such as hard candy, jelly beans, gumdrops, mints,  marshmallows, and small amounts of dark chocolate. Gerardo food cake.  Eat all sweets and desserts in moderation.  Fats and Oils  Nonhydrogenated (trans-free) margarines. Vegetable oils, including soybean, sesame, sunflower, olive, peanut, safflower, corn, canola, and cottonseed. Salad dressings or mayonnaise that are made with a vegetable oil. Limit added fats and oils that you use for cooking, baking, salads, and as spreads.  Other  Cocoa powder. Coffee and tea. All seasonings and condiments.  The items listed above may not be a complete list of recommended foods or beverages. Contact your dietitian for more options.  WHAT FOODS ARE NOT RECOMMENDED?  Grains  Breads that are made with saturated or trans fats, oils, or whole milk. Croissants. Butter rolls. Cheese breads. Sweet rolls. Donuts. Buttered popcorn. Chow mein noodles. High-fat crackers, such as cheese or butter crackers.  Meats and Other Protein Sources  Fatty meats, such as hotdogs, short ribs, sausage, spareribs, maldonado, ribeye roast or steak, and mutton. High-fat deli meats, such as salami and bologna. Caviar. Domestic duck and goose. Organ meats, such as kidney, liver, sweetbreads, brains, gizzard, chitterlings, and heart.  Dairy  Cream, sour cream, cream cheese, and creamed cottage cheese. Whole milk cheeses, including blue (chino), [place] Luis Enrique, Brie, Jason, American, Havarti, Swiss, cheddar, Camembert, and Cody.  Whole or 2% milk that is liquid, evaporated, or condensed. Whole buttermilk. Cream sauce or high-fat cheese sauce. Yogurt that is made from whole milk.  Beverages  Regular sodas and drinks with added sugar.  Sweets and Desserts  Frosting. Pudding. Cookies. Cakes other than gerardo food cake. Candy that has milk chocolate or white chocolate, hydrogenated fat, butter, coconut, or unknown ingredients. Buttered syrups. Full-fat ice cream or ice cream drinks.  Fats and Oils  Gravy that has suet, meat fat, or shortening. Cocoa butter, hydrogenated  oils, palm oil, coconut oil, palm kernel oil. These can often be found in baked products, candy, fried foods, nondairy creamers, and whipped toppings. Solid fats and shortenings, including maldonado fat, salt pork, lard, and butter. Nondairy cream substitutes, such as coffee creamers and sour cream substitutes. Salad dressings that are made of unknown oils, cheese, or sour cream.  The items listed above may not be a complete list of foods and beverages to avoid. Contact your dietitian for more information.     This information is not intended to replace advice given to you by your health care provider. Make sure you discuss any questions you have with your health care provider.     Document Released: 09/26/2009 Document Revised: 01/08/2016 Document Reviewed: 06/11/2015  [employer] Interactive Patient Education ©2016 [employer]  Fat and Cholesterol Restricted Diet  High levels of fat and cholesterol in your blood may lead to various health problems, such as diseases of the heart, blood vessels, gallbladder, liver, and pancreas. Fats are concentrated sources of energy that come in various forms. Certain types of fat, including saturated fat, may be harmful in excess. Cholesterol is a substance needed by your body in small amounts. Your body makes all the cholesterol it needs. Excess cholesterol comes from the food you eat.  When you have high levels of cholesterol and saturated fat in your blood, health problems can develop because the excess fat and cholesterol will gather along the walls of your blood vessels, causing them to narrow. Choosing the right foods will help you control your intake of fat and cholesterol. This will help keep the levels of these substances in your blood within normal limits and reduce your risk of disease.  WHAT IS MY PLAN?  Your health care provider recommends that you:  · Get no more than __________ % of the total calories in your daily diet from fat.  · Limit your intake of saturated fat to  "less than ______% of your total calories each day.  · Limit the amount of cholesterol in your diet to less than _________mg per day.  WHAT TYPES OF FAT SHOULD I CHOOSE?  · Choose healthy fats more often. Choose monounsaturated and polyunsaturated fats, such as olive and canola oil, flaxseeds, walnuts, almonds, and seeds.  · Eat more omega-3 fats. Good choices include salmon, mackerel, sardines, tuna, flaxseed oil, and ground flaxseeds. Aim to eat fish at least two times a week.  · Limit saturated fats. Saturated fats are primarily found in animal products, such as meats, butter, and cream. Plant sources of saturated fats include palm oil, palm kernel oil, and coconut oil.  · Avoid foods with partially hydrogenated oils in them. These contain trans fats. Examples of foods that contain trans fats are stick margarine, some tub margarines, cookies, crackers, and other baked goods.  WHAT GENERAL GUIDELINES DO I NEED TO FOLLOW?  These guidelines for healthy eating will help you control your intake of fat and cholesterol:  · Check food labels carefully to identify foods with trans fats or high amounts of saturated fat.  · Fill one half of your plate with vegetables and green salads.  · Fill one fourth of your plate with whole grains. Look for the word \"whole\" as the first word in the ingredient list.  · Fill one fourth of your plate with lean protein foods.  · Limit fruit to two servings a day. Choose fruit instead of juice.  · Eat more foods that contain soluble fiber. Examples of foods that contain this type of fiber are apples, broccoli, carrots, beans, peas, and barley. Aim to get 20-30 g of fiber per day.  · Eat more home-cooked food and less restaurant, buffet, and fast food.  · Limit or avoid alcohol.  · Limit foods high in starch and sugar.  · Limit fried foods.  · Cook foods using methods other than frying. Baking, boiling, grilling, and broiling are all great options.  · Lose weight if you are overweight. Losing " just 5-10% of your initial body weight can help your overall health and prevent diseases such as diabetes and heart disease.  WHAT FOODS CAN I EAT?  Grains  Whole grains, such as whole wheat or whole grain breads, crackers, cereals, and pasta. Unsweetened oatmeal, bulgur, barley, quinoa, or brown rice. Corn or whole wheat flour tortillas.  Vegetables  Fresh or frozen vegetables (raw, steamed, roasted, or grilled). Green salads.  Fruits  All fresh, canned (in natural juice), or frozen fruits.  Meat and Other Protein Products  Ground beef (85% or leaner), grass-fed beef, or beef trimmed of fat. Skinless chicken or turkey. Ground chicken or turkey. Pork trimmed of fat. All fish and seafood. Eggs. Dried beans, peas, or lentils. Unsalted nuts or seeds. Unsalted canned or dry beans.  Dairy  Low-fat dairy products, such as skim or 1% milk, 2% or reduced-fat cheeses, low-fat ricotta or cottage cheese, or plain low-fat yogurt.  Fats and Oils  Tub margarines without trans fats. Light or reduced-fat mayonnaise and salad dressings. Avocado. Olive, canola, sesame, or safflower oils. Natural peanut or almond butter (choose ones without added sugar and oil).  The items listed above may not be a complete list of recommended foods or beverages. Contact your dietitian for more options.  WHAT FOODS ARE NOT RECOMMENDED?  Grains  White bread. White pasta. White rice. Cornbread. Bagels, pastries, and croissants. Crackers that contain trans fat.  Vegetables  White potatoes. Corn. Creamed or fried vegetables. Vegetables in a cheese sauce.  Fruits  Dried fruits. Canned fruit in light or heavy syrup. Fruit juice.  Meat and Other Protein Products  Fatty cuts of meat. Ribs, chicken wings, maldonado, sausage, bologna, salami, chitterlings, fatback, hot dogs, bratwurst, and packaged luncheon meats. Liver and organ meats.  Dairy  Whole or 2% milk, cream, half-and-half, and cream cheese. Whole milk cheeses. Whole-fat or sweetened yogurt. Full-fat  cheeses. Nondairy creamers and whipped toppings. Processed cheese, cheese spreads, or cheese curds.  Sweets and Desserts  Corn syrup, sugars, honey, and molasses. Candy. Jam and jelly. Syrup. Sweetened cereals. Cookies, pies, cakes, donuts, muffins, and ice cream.  Fats and Oils  Butter, stick margarine, lard, shortening, ghee, or maldonado fat. Coconut, palm kernel, or palm oils.  Beverages  Alcohol. Sweetened drinks (such as sodas, lemonade, and fruit drinks or punches).  The items listed above may not be a complete list of foods and beverages to avoid. Contact your dietitian for more information.     This information is not intended to replace advice given to you by your health care provider. Make sure you discuss any questions you have with your health care provider.     Document Released: 12/18/2006 Document Revised: 01/08/2016 Document Reviewed: 03/18/2015  [employer] Interactive Patient Education ©2016 [employer]       Patient Instructions History      Upcoming Appointments     Visit Type Date Time Department    OFFICE VISIT 1/25/2017  8:30 AM [facility]    FOLLOW UP 3/9/2017  8:30 AM [facility] Signup     Our records indicate that you have an active [employer] account.    You can view your After Visit Summary by going to [employer] and logging in with your [employer] username and password.  If you don't have a [employer] username and password but a parent or guardian has access to your record, the parent or guardian should login with their own [employer] username and password and access your record to view the After Visit Summary.    If you have questions, you can email [email or web address] or call [phone] to talk to our [employer] staff.  Remember, [employer] is NOT to be used for urgent needs.  For medical emergencies, dial 911.               Other Info from Your Visit           Your Appointments     Mar 09, 2017  8:30 AM EST   Follow Up with Jeana  "CACHORRO Huynh   [facility] CARDIOLOGY (--)    [address] [place] [id number] [phone]           Arrive 15 minutes prior to appointment.              Allergies     Naproxen        Reason for Visit     Follow-up presents as a follow up      Vital Signs     Blood Pressure Pulse Height Weight Oxygen Saturation Body Mass Index    116/60 (BP Location: Left arm, Patient Position: Sitting) 85 61\" (154.9 cm) 227 lb 9.6 oz (103 kg) 97% 43 kg/m2    Smoking Status                   Former Smoker           Problems and Diagnoses Noted     Atrial fibrillation (irregular heartbeat)    Moderate to severe pulmonary hypertension    High blood pressure        Peripheral edema        Routine medical exam            "

## 2017-02-10 DIAGNOSIS — I10 ESSENTIAL HYPERTENSION: Primary | ICD-10-CM

## 2017-02-10 RX ORDER — HYDROCHLOROTHIAZIDE 12.5 MG/1
12.5 TABLET ORAL DAILY
Qty: 30 TABLET | Refills: 4 | Status: SHIPPED | OUTPATIENT
Start: 2017-02-10 | End: 2018-06-01

## 2017-02-10 RX ORDER — POTASSIUM CHLORIDE 750 MG/1
10 TABLET, FILM COATED, EXTENDED RELEASE ORAL DAILY
Qty: 30 TABLET | Refills: 3 | Status: SHIPPED | OUTPATIENT
Start: 2017-02-10 | End: 2017-03-09 | Stop reason: SDUPTHER

## 2017-03-06 RX ORDER — POTASSIUM CHLORIDE 750 MG/1
TABLET, EXTENDED RELEASE ORAL
Qty: 30 TABLET | Refills: 0 | OUTPATIENT
Start: 2017-03-06

## 2017-03-09 ENCOUNTER — OFFICE VISIT (OUTPATIENT)
Dept: CARDIOLOGY | Facility: CLINIC | Age: 62
End: 2017-03-09

## 2017-03-09 VITALS
SYSTOLIC BLOOD PRESSURE: 115 MMHG | HEIGHT: 61 IN | OXYGEN SATURATION: 100 % | HEART RATE: 76 BPM | RESPIRATION RATE: 16 BRPM | DIASTOLIC BLOOD PRESSURE: 71 MMHG | WEIGHT: 213.4 LBS | BODY MASS INDEX: 40.29 KG/M2

## 2017-03-09 DIAGNOSIS — I10 ESSENTIAL HYPERTENSION: ICD-10-CM

## 2017-03-09 DIAGNOSIS — I48.0 PAROXYSMAL ATRIAL FIBRILLATION (HCC): Primary | ICD-10-CM

## 2017-03-09 DIAGNOSIS — R60.9 PERIPHERAL EDEMA: ICD-10-CM

## 2017-03-09 PROCEDURE — 99213 OFFICE O/P EST LOW 20 MIN: CPT | Performed by: NURSE PRACTITIONER

## 2017-03-09 RX ORDER — FUROSEMIDE 40 MG/1
40 TABLET ORAL DAILY
Qty: 30 TABLET | Refills: 5 | Status: SHIPPED | OUTPATIENT
Start: 2017-03-09 | End: 2017-04-20 | Stop reason: ALTCHOICE

## 2017-03-09 RX ORDER — POTASSIUM CHLORIDE 750 MG/1
10 TABLET, FILM COATED, EXTENDED RELEASE ORAL DAILY
Qty: 30 TABLET | Refills: 3 | Status: SHIPPED | OUTPATIENT
Start: 2017-03-09 | End: 2017-09-18 | Stop reason: SDUPTHER

## 2017-03-09 NOTE — PROGRESS NOTES
Subjective   Christina Holbrook is a 61 y.o. female     Chief Complaint   Patient presents with   • Follow-up       [facility]    Problem List:    1. Persistent Afib (CHADS 2 - Eliquis)  2. Hypertension  2.1 Echo 5/3/16 - EF 60-65%; trace MR; mild TR; PA 58  2.2 Echo 10/6/16 - EF 65%; mild TR; PA 35-45  3. Pulmonary Hypertension  4. Chronic BLE Edema   5. H/O CHF   6. Chest Pain  6.1 Stress Test 5/3/16 - small fixed defect in mid anterolateral, apical lateral segments; extent of defect mild to mod; post stress EF 40%  6.2 Stress Test 1/4/17 - no ischemia; low risk  7. DEEPAK - CPAP     Patient is a 61-year-old female [facility] [place] [facility] [place]  She denies any chest pain, pressure, palpitations, fluttering, dizziness, presyncope, syncope, orthopnea or PND.  She will get some edema, but it is much better since she has lost weight.  She denies any shortness of breath with activity.  She walks every day for exercise.  PCP monitors her cholesterol. She denies any signs of bleeding or cerebral ischemic events.    Current Outpatient Prescriptions   Medication Sig Dispense Refill   • apixaban (ELIQUIS) 5 MG tablet tablet Take 1 tablet by mouth 2 (Two) Times a Day. 60 tablet 5   • furosemide (LASIX) 40 MG tablet Take 1 tablet by mouth Daily. 30 tablet 5   • hydrochlorothiazide (HYDRODIURIL) 12.5 MG tablet Take 1 tablet by mouth Daily. 30 tablet 4   • metoprolol tartrate (LOPRESSOR) 50 MG tablet Take 1 tablet by mouth Every 12 (Twelve) Hours. 60 tablet 5   • potassium chloride (K-DUR) 10 MEQ CR tablet Take 1 tablet by mouth Daily. 30 tablet 3     No current facility-administered medications for this visit.        ALLERGIES    Naproxen    Past Medical History   Diagnosis Date   • A-fib    • CHF (congestive heart failure)    • Diabetes mellitus    • Disease of thyroid gland    • DVT (deep venous thrombosis)      hx   • Pulmonary HTN      severe       Social History     Social History   • Marital status:      Spouse  "name: N/A   • Number of children: N/A   • Years of education: N/A     Occupational History   • Not on file.     Social History Main Topics   • Smoking status: Former Smoker     Packs/day: 1.00     Types: Cigarettes     Quit date: 1993   • Smokeless tobacco: Not on file   • Alcohol use No   • Drug use: No   • Sexual activity: Defer     Other Topics Concern   • Not on file     Social History Narrative       Family History   Problem Relation Age of Onset   • Heart disease Father    • Heart disease Sister    • Heart disease Brother        Review of Systems   Constitutional: Negative for diaphoresis and fatigue.   HENT: Positive for rhinorrhea and sneezing.    Eyes: Negative for visual disturbance.   Respiratory: Negative for chest tightness and shortness of breath.    Cardiovascular: Positive for leg swelling (still there, but not as bad ). Negative for chest pain and palpitations.   Gastrointestinal: Negative for nausea and vomiting.   Endocrine: Negative.    Genitourinary: Negative for difficulty urinating.   Musculoskeletal: Negative.  Negative for arthralgias, back pain and neck pain.   Allergic/Immunologic: Positive for environmental allergies.   Neurological: Positive for headaches (when takes BID dose of Eliquis, but better with only once a day ). Negative for dizziness, syncope and light-headedness.   Hematological: Negative.    Psychiatric/Behavioral: Negative.        Objective   Visit Vitals   • /71 (BP Location: Left arm, Patient Position: Sitting)   • Pulse 76   • Resp 16   • Ht 61\" (154.9 cm)   • Wt 213 lb 6.4 oz (96.8 kg)   • SpO2 100%   • BMI 40.32 kg/m2     Lab Results (most recent)     None        Physical Exam   Constitutional: She is oriented to person, place, and time. Vital signs are normal. She appears well-developed and well-nourished. She is active and cooperative.   HENT:   Head: Normocephalic.   Mouth/Throat: Abnormal dentition: missing teeth    Eyes: Lids are normal.   Neck: Normal " carotid pulses, no hepatojugular reflux and no JVD present. Carotid bruit is present (lt bruit ).   Cardiovascular: Normal rate.  An irregularly irregular rhythm present.   Murmur heard.   Systolic (RUSB and LUSB 1-2/6) murmur is present   Pulses:       Radial pulses are 2+ on the right side, and 2+ on the left side.        Dorsalis pedis pulses are 2+ on the right side, and 2+ on the left side.        Posterior tibial pulses are 2+ on the right side, and 2+ on the left side.   Trace edema BLE; 1+ edema LLE.    Pulmonary/Chest: Effort normal and breath sounds normal.   Abdominal: Normal appearance.   Neurological: She is alert and oriented to person, place, and time.   Skin: Skin is warm, dry and intact.   Reynauds finger tips; PVD changes BLE.    Psychiatric: She has a normal mood and affect. Her speech is normal and behavior is normal. Judgment and thought content normal. Cognition and memory are normal.       Procedure   Procedures         Assessment/Plan      Diagnosis Plan   1. Paroxysmal atrial fibrillation  apixaban (ELIQUIS) 5 MG tablet tablet   2. Peripheral edema  furosemide (LASIX) 40 MG tablet   3. Essential hypertension  potassium chloride (K-DUR) 10 MEQ CR tablet       Return in about 6 weeks (around 4/20/2017).       Patient is doing very well from a cardiac standpoint.  She will continue her medication regimen.  She will follow-up in 6 weeks or sooner if any changes.  I wanted her to switch to xarelto because she is only taking her eliquis once a day, which is not therapeutic, however, we had not samples.  She will come back in 6 weeks and we will switch her then.  She feels taking the full dose of eliquis gives her a headache.  Explained the need for BID coverage.

## 2017-03-09 NOTE — PATIENT INSTRUCTIONS
Edema  Edema is an abnormal buildup of fluids in your body tissues. Edema is somewhat dependent on gravity to pull the fluid to the lowest place in your body. That makes the condition more common in the legs and thighs (lower extremities). Painless swelling of the feet and ankles is common and becomes more likely as you get older. It is also common in looser tissues, like around your eyes.   When the affected area is squeezed, the fluid may move out of that spot and leave a dent for a few moments. This dent is called pitting.   CAUSES   There are many possible causes of edema. Eating too much salt and being on your feet or sitting for a long time can cause edema in your legs and ankles. Hot weather may make edema worse. Common medical causes of edema include:  · Heart failure.  · Liver disease.  · Kidney disease.  · Weak blood vessels in your legs.  · Cancer.  · An injury.  · Pregnancy.  · Some medications.  · Obesity.   SYMPTOMS   Edema is usually painless. Your skin may look swollen or shiny.   DIAGNOSIS   Your health care provider may be able to diagnose edema by asking about your medical history and doing a physical exam. You may need to have tests such as X-rays, an electrocardiogram, or blood tests to check for medical conditions that may cause edema.   TREATMENT   Edema treatment depends on the cause. If you have heart, liver, or kidney disease, you need the treatment appropriate for these conditions. General treatment may include:  · Elevation of the affected body part above the level of your heart.  · Compression of the affected body part. Pressure from elastic bandages or support stockings squeezes the tissues and forces fluid back into the blood vessels. This keeps fluid from entering the tissues.  · Restriction of fluid and salt intake.  · Use of a water pill (diuretic). These medications are appropriate only for some types of edema. They pull fluid out of your body and make you urinate more often. This  gets rid of fluid and reduces swelling, but diuretics can have side effects. Only use diuretics as directed by your health care provider.  HOME CARE INSTRUCTIONS   · Keep the affected body part above the level of your heart when you are lying down.    · Do not sit still or stand for prolonged periods.    · Do not put anything directly under your knees when lying down.  · Do not wear constricting clothing or garters on your upper legs.    · Exercise your legs to work the fluid back into your blood vessels. This may help the swelling go down.    · Wear elastic bandages or support stockings to reduce ankle swelling as directed by your health care provider.    · Eat a low-salt diet to reduce fluid if your health care provider recommends it.    · Only take medicines as directed by your health care provider.   SEEK MEDICAL CARE IF:   · Your edema is not responding to treatment.  · You have heart, liver, or kidney disease and notice symptoms of edema.  · You have edema in your legs that does not improve after elevating them.    · You have sudden and unexplained weight gain.  SEEK IMMEDIATE MEDICAL CARE IF:   · You develop shortness of breath or chest pain.    · You cannot breathe when you lie down.  · You develop pain, redness, or warmth in the swollen areas.    · You have heart, liver, or kidney disease and suddenly get edema.  · You have a fever and your symptoms suddenly get worse.  MAKE SURE YOU:   · Understand these instructions.  · Will watch your condition.  · Will get help right away if you are not doing well or get worse.     This information is not intended to replace advice given to you by your health care provider. Make sure you discuss any questions you have with your health care provider.     Document Released: 12/18/2006 Document Revised: 01/08/2016 Document Reviewed: 10/10/2014  [employer] Interactive Patient Education ©2016 [employer]

## 2017-04-18 ENCOUNTER — DOCUMENTATION (OUTPATIENT)
Dept: CARDIOLOGY | Facility: CLINIC | Age: 62
End: 2017-04-18

## 2017-04-18 NOTE — PROGRESS NOTES
We received an rx request for refill of Metoprolol 50 mg po bid.  We cannot fill this, as pt is now following with cardiology in [place].  I called [facility] and advised them.

## 2017-04-20 ENCOUNTER — OFFICE VISIT (OUTPATIENT)
Dept: CARDIOLOGY | Facility: CLINIC | Age: 62
End: 2017-04-20

## 2017-04-20 VITALS
SYSTOLIC BLOOD PRESSURE: 129 MMHG | BODY MASS INDEX: 39.2 KG/M2 | HEART RATE: 71 BPM | DIASTOLIC BLOOD PRESSURE: 72 MMHG | OXYGEN SATURATION: 99 % | HEIGHT: 61 IN | WEIGHT: 207.6 LBS

## 2017-04-20 DIAGNOSIS — R09.89 BRUIT OF LEFT CAROTID ARTERY: ICD-10-CM

## 2017-04-20 DIAGNOSIS — R60.9 PERIPHERAL EDEMA: ICD-10-CM

## 2017-04-20 DIAGNOSIS — I27.20 PULMONARY HYPERTENSION (HCC): ICD-10-CM

## 2017-04-20 DIAGNOSIS — I48.19 PERSISTENT ATRIAL FIBRILLATION (HCC): Primary | ICD-10-CM

## 2017-04-20 DIAGNOSIS — E05.90 HYPERTHYROIDISM: ICD-10-CM

## 2017-04-20 DIAGNOSIS — M79.605 PAIN IN BOTH LOWER EXTREMITIES: ICD-10-CM

## 2017-04-20 DIAGNOSIS — M79.604 PAIN IN BOTH LOWER EXTREMITIES: ICD-10-CM

## 2017-04-20 DIAGNOSIS — I10 ESSENTIAL HYPERTENSION: ICD-10-CM

## 2017-04-20 DIAGNOSIS — R09.89 DECREASED PEDAL PULSES: ICD-10-CM

## 2017-04-20 DIAGNOSIS — R51.9 GENERALIZED HEADACHES: ICD-10-CM

## 2017-04-20 PROCEDURE — 99213 OFFICE O/P EST LOW 20 MIN: CPT | Performed by: NURSE PRACTITIONER

## 2017-04-20 RX ORDER — METHIMAZOLE 5 MG/1
5 TABLET ORAL DAILY
COMMUNITY
End: 2019-06-07

## 2017-04-20 RX ORDER — FUROSEMIDE 20 MG/1
20 TABLET ORAL DAILY
COMMUNITY
End: 2017-06-01 | Stop reason: ALTCHOICE

## 2017-04-20 NOTE — PATIENT INSTRUCTIONS
Edema  Edema is an abnormal buildup of fluids in your body tissues. Edema is somewhat dependent on gravity to pull the fluid to the lowest place in your body. That makes the condition more common in the legs and thighs (lower extremities). Painless swelling of the feet and ankles is common and becomes more likely as you get older. It is also common in looser tissues, like around your eyes.   When the affected area is squeezed, the fluid may move out of that spot and leave a dent for a few moments. This dent is called pitting.   CAUSES   There are many possible causes of edema. Eating too much salt and being on your feet or sitting for a long time can cause edema in your legs and ankles. Hot weather may make edema worse. Common medical causes of edema include:  · Heart failure.  · Liver disease.  · Kidney disease.  · Weak blood vessels in your legs.  · Cancer.  · An injury.  · Pregnancy.  · Some medications.  · Obesity.   SYMPTOMS   Edema is usually painless. Your skin may look swollen or shiny.   DIAGNOSIS   Your health care provider may be able to diagnose edema by asking about your medical history and doing a physical exam. You may need to have tests such as X-rays, an electrocardiogram, or blood tests to check for medical conditions that may cause edema.   TREATMENT   Edema treatment depends on the cause. If you have heart, liver, or kidney disease, you need the treatment appropriate for these conditions. General treatment may include:  · Elevation of the affected body part above the level of your heart.  · Compression of the affected body part. Pressure from elastic bandages or support stockings squeezes the tissues and forces fluid back into the blood vessels. This keeps fluid from entering the tissues.  · Restriction of fluid and salt intake.  · Use of a water pill (diuretic). These medications are appropriate only for some types of edema. They pull fluid out of your body and make you urinate more often. This  gets rid of fluid and reduces swelling, but diuretics can have side effects. Only use diuretics as directed by your health care provider.  HOME CARE INSTRUCTIONS   · Keep the affected body part above the level of your heart when you are lying down.    · Do not sit still or stand for prolonged periods.    · Do not put anything directly under your knees when lying down.  · Do not wear constricting clothing or garters on your upper legs.    · Exercise your legs to work the fluid back into your blood vessels. This may help the swelling go down.    · Wear elastic bandages or support stockings to reduce ankle swelling as directed by your health care provider.    · Eat a low-salt diet to reduce fluid if your health care provider recommends it.    · Only take medicines as directed by your health care provider.   SEEK MEDICAL CARE IF:   · Your edema is not responding to treatment.  · You have heart, liver, or kidney disease and notice symptoms of edema.  · You have edema in your legs that does not improve after elevating them.    · You have sudden and unexplained weight gain.  SEEK IMMEDIATE MEDICAL CARE IF:   · You develop shortness of breath or chest pain.    · You cannot breathe when you lie down.  · You develop pain, redness, or warmth in the swollen areas.    · You have heart, liver, or kidney disease and suddenly get edema.  · You have a fever and your symptoms suddenly get worse.  MAKE SURE YOU:   · Understand these instructions.  · Will watch your condition.  · Will get help right away if you are not doing well or get worse.     This information is not intended to replace advice given to you by your health care provider. Make sure you discuss any questions you have with your health care provider.     Document Released: 12/18/2006 Document Revised: 01/13/2017 Document Reviewed: 10/10/2014  [employer] Interactive Patient Education ©2016 [employer]

## 2017-04-20 NOTE — PROGRESS NOTES
Subjective   Christina Holbrook is a 61 y.o. female     Chief Complaint   Patient presents with   • Follow-up     presents as a follow up       HPI    Problem List:    1. Persistent Afib (CHADS 2 - Eliquis)  2. Hypertension  2.1 Echo 5/3/16 - EF 60-65%; trace MR; mild TR; PA 58  2.2 Echo 10/6/16 - EF 65%; mild TR; PA 35-45  3. Pulmonary Hypertension  4. Chronic BLE Edema   5. H/O CHF   6. Chest Pain  6.1 Stress Test 5/3/16 - small fixed defect in mid anterolateral, apical lateral segments; extent of defect mild to mod; post stress EF 40%  6.2 Stress Test 1/4/17 - no ischemia; low risk  7. DEEPAK - CPAP     Patient is a 61-year-old female who presents today for a follow-up.  She denies any chest pain, pressure, palpitations, fluttering, dizziness, presyncope, syncope, orthopnea or PND.  She does get BLE edema, which she says is better some.  She says since she has been on the tapazole it is a little more.  She does not get short of breath with her daily routine.  She denies any signs of bleeding or cerebral ischemic events.  She had labs at PCP.  We discussed anticoagulation and she would like to stay on Eliquis and she is taking it BID.     Current Outpatient Prescriptions   Medication Sig Dispense Refill   • apixaban (ELIQUIS) 5 MG tablet tablet Take 1 tablet by mouth 2 (Two) Times a Day. 60 tablet 5   • furosemide (LASIX) 20 MG tablet Take 20 mg by mouth Daily.     • methIMAzole (TAPAZOLE) 10 MG tablet Take 10 mg by mouth Daily.     • metoprolol tartrate (LOPRESSOR) 50 MG tablet Take 1 tablet by mouth Every 12 (Twelve) Hours. 60 tablet 5   • potassium chloride (K-DUR) 10 MEQ CR tablet Take 1 tablet by mouth Daily. 30 tablet 3   • hydrochlorothiazide (HYDRODIURIL) 12.5 MG tablet Take 1 tablet by mouth Daily. (Patient taking differently: Take 12.5 mg by mouth Daily. Every other day) 30 tablet 4     No current facility-administered medications for this visit.        ALLERGIES    Naproxen    Past Medical History:  "  Diagnosis Date   • A-fib    • CHF (congestive heart failure)    • Diabetes mellitus    • Disease of thyroid gland    • DVT (deep venous thrombosis)     hx   • Pulmonary HTN     severe       Social History     Social History   • Marital status:      Spouse name: N/A   • Number of children: N/A   • Years of education: N/A     Occupational History   • Not on file.     Social History Main Topics   • Smoking status: Former Smoker     Packs/day: 1.00     Types: Cigarettes     Quit date: 1993   • Smokeless tobacco: Not on file   • Alcohol use No   • Drug use: No   • Sexual activity: Defer     Other Topics Concern   • Not on file     Social History Narrative       Family History   Problem Relation Age of Onset   • Heart disease Father    • Heart disease Sister    • Heart disease Brother        Review of Systems   Constitutional: Negative for diaphoresis and fatigue.   HENT: Positive for rhinorrhea and sneezing.    Eyes: Negative for visual disturbance.   Respiratory: Negative for chest tightness and shortness of breath.    Cardiovascular: Positive for leg swelling (little better ). Negative for chest pain and palpitations.   Gastrointestinal: Negative for nausea and vomiting.   Endocrine: Negative.    Genitourinary: Negative for difficulty urinating.   Musculoskeletal: Positive for arthralgias. Negative for back pain and neck pain.   Skin: Negative.    Allergic/Immunologic: Positive for environmental allergies.   Neurological: Positive for headaches (better not as much any more). Negative for dizziness, syncope and light-headedness.   Hematological: Negative.    Psychiatric/Behavioral: Negative.        Objective   /72 (BP Location: Left arm, Patient Position: Sitting)  Pulse 71  Ht 61\" (154.9 cm)  Wt 207 lb 9.6 oz (94.2 kg)  SpO2 99%  BMI 39.23 kg/m2  Lab Results (most recent)     None        Physical Exam   Constitutional: She is oriented to person, place, and time. Vital signs are normal. She appears " well-developed and well-nourished. She is active and cooperative.   HENT:   Head: Normocephalic.   Eyes: Lids are normal.   Neck: Normal carotid pulses, no hepatojugular reflux and no JVD present. Carotid bruit is present (left ).   Cardiovascular: Normal rate.  An irregularly irregular rhythm present.   Murmur heard.   Systolic (RUSB and LUSB ) murmur is present with a grade of 2/6   Pulses:       Radial pulses are 2+ on the right side, and 2+ on the left side.   1+ edema BLE; diminished pedal pulses.    Pulmonary/Chest: Effort normal and breath sounds normal.   Abdominal: Normal appearance.   Neurological: She is alert and oriented to person, place, and time.   Skin: Skin is warm, dry and intact.   PVD changes BLE   Psychiatric: She has a normal mood and affect. Her speech is normal and behavior is normal. Judgment and thought content normal. Cognition and memory are normal.       Procedure   Procedures         Assessment/Plan      Diagnosis Plan   1. Persistent atrial fibrillation     2. Essential hypertension     3. Pulmonary hypertension     4. Bruit of left carotid artery  Duplex Carotid Ultrasound CAR    Duplex Carotid Ultrasound CAR   5. Peripheral edema  Duplex Lower Extremity Art / Grafts - Bilateral CAR    Duplex Lower Extremity Art / Grafts - Bilateral CAR   6. Pain in both lower extremities  Duplex Lower Extremity Art / Grafts - Bilateral CAR    Duplex Lower Extremity Art / Grafts - Bilateral CAR   7. Decreased pedal pulses  Duplex Lower Extremity Art / Grafts - Bilateral CAR    Duplex Lower Extremity Art / Grafts - Bilateral CAR   8. Generalized headaches     9. Hyperthyroidism         Return in about 6 weeks (around 6/1/2017).       Patient will have a carotid U/S and arterial U/S.  She will continue her medication regimen.  She will follow-up in 6 weeks or sooner if any changes.  I called Dr. Jesus's office and they are [facility].  [phone]

## 2017-05-01 ENCOUNTER — HOSPITAL ENCOUNTER (OUTPATIENT)
Dept: CARDIOLOGY | Facility: HOSPITAL | Age: 62
Discharge: HOME | End: 2017-05-01
Admitting: NURSE PRACTITIONER

## 2017-05-01 ENCOUNTER — HOSPITAL ENCOUNTER (OUTPATIENT)
Dept: CARDIOLOGY | Facility: HOSPITAL | Age: 62
Discharge: HOME | End: 2017-05-01

## 2017-05-01 DIAGNOSIS — M79.605 PAIN IN BOTH LOWER EXTREMITIES: ICD-10-CM

## 2017-05-01 DIAGNOSIS — R09.89 BRUIT OF LEFT CAROTID ARTERY: ICD-10-CM

## 2017-05-01 DIAGNOSIS — M79.604 PAIN IN BOTH LOWER EXTREMITIES: ICD-10-CM

## 2017-05-01 DIAGNOSIS — R09.89 DECREASED PEDAL PULSES: ICD-10-CM

## 2017-05-01 DIAGNOSIS — R60.9 PERIPHERAL EDEMA: ICD-10-CM

## 2017-05-01 PROCEDURE — 93880 EXTRACRANIAL BILAT STUDY: CPT

## 2017-05-01 PROCEDURE — 93925 LOWER EXTREMITY STUDY: CPT

## 2017-05-01 PROCEDURE — 93923 UPR/LXTR ART STDY 3+ LVLS: CPT

## 2017-05-01 PROCEDURE — 93923 UPR/LXTR ART STDY 3+ LVLS: CPT | Performed by: INTERNAL MEDICINE

## 2017-05-01 PROCEDURE — 93880 EXTRACRANIAL BILAT STUDY: CPT | Performed by: INTERNAL MEDICINE

## 2017-05-16 RX ORDER — METOPROLOL TARTRATE 50 MG/1
50 TABLET, FILM COATED ORAL EVERY 12 HOURS SCHEDULED
Qty: 60 TABLET | Refills: 5 | Status: SHIPPED | OUTPATIENT
Start: 2017-05-16 | End: 2017-11-12 | Stop reason: SDUPTHER

## 2017-06-01 ENCOUNTER — OFFICE VISIT (OUTPATIENT)
Dept: CARDIOLOGY | Facility: CLINIC | Age: 62
End: 2017-06-01

## 2017-06-01 VITALS
BODY MASS INDEX: 39.12 KG/M2 | WEIGHT: 207.2 LBS | DIASTOLIC BLOOD PRESSURE: 71 MMHG | OXYGEN SATURATION: 96 % | HEART RATE: 78 BPM | HEIGHT: 61 IN | SYSTOLIC BLOOD PRESSURE: 123 MMHG

## 2017-06-01 DIAGNOSIS — G47.33 OSA (OBSTRUCTIVE SLEEP APNEA): ICD-10-CM

## 2017-06-01 DIAGNOSIS — R60.9 PERIPHERAL EDEMA: ICD-10-CM

## 2017-06-01 DIAGNOSIS — I10 ESSENTIAL HYPERTENSION: ICD-10-CM

## 2017-06-01 DIAGNOSIS — I48.19 PERSISTENT ATRIAL FIBRILLATION (HCC): Primary | ICD-10-CM

## 2017-06-01 PROCEDURE — 99213 OFFICE O/P EST LOW 20 MIN: CPT | Performed by: NURSE PRACTITIONER

## 2017-06-01 RX ORDER — FUROSEMIDE 40 MG/1
40 TABLET ORAL DAILY
COMMUNITY
End: 2018-02-27 | Stop reason: SDUPTHER

## 2017-06-01 NOTE — PATIENT INSTRUCTIONS
"Carotid Artery Disease  The carotid arteries are the two main arteries on either side of the neck that supply blood to the brain. Carotid artery disease, also called carotid artery stenosis, is the narrowing or blockage of one or both carotid arteries. Carotid artery disease increases your risk for a stroke or a transient ischemic attack (TIA). A TIA is an episode in which a waxy, fatty substance that accumulates within the artery (plaque) blocks blood flow to the brain. A TIA is considered a \"warning stroke.\"   CAUSES   · Buildup of plaque inside the carotid arteries (atherosclerosis) (common).  · A weakened outpouching in an artery (aneurysm).  · Inflammation of the carotid artery (arteritis).  · A fibrous growth within the carotid artery (fibromuscular dysplasia).  · Tissue death within the carotid artery due to radiation treatment (post-radiation necrosis).  · Decreased blood flow due to spasms of the carotid artery (vasospasm).  · Separation of the walls of the carotid artery (carotid dissection).  RISK FACTORS  · High cholesterol (dyslipidemia).    · High blood pressure (hypertension).    · Smoking.    · Obesity.    · Diabetes.    · Family history of cardiovascular disease.    · Inactivity or lack of regular exercise.    · Being male. Men have an increased risk of developing atherosclerosis earlier in life than women.    SYMPTOMS   Carotid artery disease does not cause symptoms.  DIAGNOSIS  Diagnosis of carotid artery disease may include:   · A physical exam. Your health care provider may hear an abnormal sound (bruit) when listening to the carotid arteries.    · Specific tests that look at the blood flow in the carotid arteries. These tests include:      Carotid artery ultrasonography.      Carotid or cerebral angiography.      Computerized tomographic angiography (CTA).      Magnetic resonance angiography (MRA).    TREATMENT   Treatment of carotid artery disease can include a combination of treatments. " Treatment options include:  · Surgery. You may have:      A carotid endarterectomy. This is a surgery to remove the blockages in the carotid arteries.      A carotid angioplasty with stenting. This is a nonsurgical interventional procedure. A wire mesh (stent) is used to widen the blocked carotid arteries.    · Medicines to control blood pressure, cholesterol, and reduce blood clotting (antiplatelet therapy).    · Adjusting your diet.    · Lifestyle changes such as:      Quitting smoking.      Exercising as tolerated or as directed by your health care provider.      Controlling and maintaining a good blood pressure.      Keeping cholesterol levels under control.    HOME CARE INSTRUCTIONS   · Take medicines only as directed by your health care provider. Make sure you understand all your medicine instructions. Do not stop your medicines without talking to your health care provider.    · Follow your health care provider's diet instructions. It is important to eat a healthy diet that is low in saturated fats and includes plenty of fresh fruits, vegetables, and lean meats. High-fat, high-sodium foods as well as foods that are fried, overly processed, or have poor nutritional value should be avoided.  · Maintain a healthy weight.    · Stay physically active. It is recommended that you get at least 30 minutes of activity every day.    · Do not use any tobacco products including cigarettes, chewing tobacco, or electronic cigarettes. If you need help quitting, ask your health care provider.  · Limit alcohol use to:      No more than 2 drinks per day for men.      No more than 1 drink per day for nonpregnant women.    · Do not use illegal drugs.    · Keep all follow-up visits as directed by your health care provider.    SEEK IMMEDIATE MEDICAL CARE IF:   You develop TIA or stroke symptoms. These include:   · Sudden weakness or numbness on one side of the body, such as in the face, arm, or leg.    · Sudden confusion.     · Trouble speaking (aphasia) or understanding.    · Sudden trouble seeing out of one or both eyes.    · Sudden trouble walking.    · Dizziness or feeling like you might faint.    · Loss of balance or coordination.    · Sudden severe headache with no known cause.    · Sudden trouble swallowing (dysphagia).    If you have any of these symptoms, call your local emergency services (911 in [place] Do not drive yourself to the clinic or hospital. This is a medical emergency.      This information is not intended to replace advice given to you by your health care provider. Make sure you discuss any questions you have with your health care provider.     Document Released: 03/11/2013 Document Revised: 01/08/2016 Document Reviewed: 06/18/2014  [employer] Interactive Patient Education ©2017 [employer]  Dyslipidemia  Dyslipidemia is an imbalance of the lipids in your blood. Lipids are waxy, fat-like proteins that your body needs in small amounts. Dyslipidemia often involves the lipids cholesterol or triglycerides. Common forms of dyslipidemia are:  · High levels of bad cholesterol (LDL cholesterol). LDL cholesterol is the type of cholesterol that causes heart disease.  · Low levels of good cholesterol (HDL cholesterol). HDL cholesterol is the type of cholesterol that helps protect against heart disease.  · High levels of triglycerides. Triglycerides are a fatty substance in the blood linked to a buildup of plaque on your arteries.  RISK FACTORS  · Increased age.  · Having a family history of high cholesterol.  · Certain medicines, including birth control pills, diuretics, beta-blockers, and some medicines for depression.  · Smoking.  · Eating a high-fat diet.  · Being overweight.  · Medical conditions such as diabetes, polycystic ovary syndrome, pregnancy, kidney disease, and hypothyroidism.  · Lack of regular exercise.  SIGNS AND SYMPTOMS  There are no signs or symptoms with dyslipidemia.  DIAGNOSIS  A simple blood test  called a fasting blood test can be done to determine your level of:  · Total cholesterol. This is the combined number of LDL cholesterol and HDL cholesterol. A healthy number is lower than 200.  · LDL cholesterol. The goal number for LDL cholesterol is different for each person depending on risk factors. Ask your health care provider what your LDL cholesterol number should be.  · HDL cholesterol. A healthy level of HDL cholesterol is 60 or higher. A number lower than 40 for men or 50 for women is a danger sign.  · Triglycerides. A healthy triglyceride number is less than 150.  TREATMENT  Dyslipidemia is a treatable condition. Your health care provider will advise you on what type of treatment is best based on your age, your test results, and current guidelines. Treatment may include:  · Dietary changes. A dietitian may help you create a diet that is based on your risk factors, conditions, and lifestyle.  · Regular exercise. This can help lower your LDL cholesterol, raise your HDL cholesterol, and help with weight management. Check with your health care provider before beginning an exercise program. Most people should participate in 30 minutes of brisk exercise 5 days a week.  · Quitting smoking.  · Medicines to lower LDL cholesterol and triglycerides.  · If you have high levels of triglycerides, your health care provider may:    Have you stop drinking alcohol.    Have you restrict your fat intake.    Have you eliminate refined sugars from your diet.    Treat you for other conditions, such as underactive thyroid gland (hypothyroidism) and high blood sugar (hyperglycemia).  Your health care provider will monitor your lipid levels with regular blood tests.  HOME CARE INSTRUCTIONS  · Eat a healthy diet. Follow any diet instructions if they were given to you by your health care provider.  · Maintain a healthy weight.  · Exercise regularly based on the recommendations of your health care provider.  · Do not use any  tobacco products, including cigarettes, chewing tobacco, or electronic cigarettes.  · Take medicines only as directed by your health care provider.  · Keep all follow-up visits as directed by your health care provider.  SEEK MEDICAL CARE IF:  You are having possible side effects from your medicines.     This information is not intended to replace advice given to you by your health care provider. Make sure you discuss any questions you have with your health care provider.     Document Released: 12/23/2014 Document Revised: 01/08/2016 Document Reviewed: 12/23/2014  [employer] Interactive Patient Education ©2017 [employer]

## 2017-06-01 NOTE — PROGRESS NOTES
Subjective   Christina Holbrook is a 61 y.o. female     Chief Complaint   Patient presents with   • Follow-up     presents as a follow up       HPI    Problem List:    1. Persistent Afib (CHADS 2 - Eliquis)  2. Hypertension  2.1 Echo 5/3/16 - EF 60-65%; trace MR; mild TR; PA 58  2.2 Echo 10/6/16 - EF 65%; mild TR; PA 35-45  3. Pulmonary Hypertension  4. Chronic BLE Edema   4.1 BLE Arterial U/S 5/5/17 - no evidence of sig atheroma obst disease at nay level on either side.   5. H/O CHF   6. Chest Pain  6.1 Stress Test 5/3/16 - small fixed defect in mid anterolateral, apical lateral segments; extent of defect mild to mod; post stress EF 40%  6.2 Stress Test 1/4/17 - no ischemia; low risk  7. DEEPAK - CPAP   8. Carotid Artery Disease   8.1 Carotid Artery U/S 5/5/17 - bilateral bifurcation 16-49%; less than 15% both ICA. Antegrade flow both vertebral arteries      Patient is a 61-year-old female who presents today for a follow-up.  She denies any chest pain, pressure, palpitations, fluttering, dizziness, presyncope, syncope, orthopnea or PND.  She still gets BLE edema.  She does not get short of breath with activity.  She is doing so well she says.  She will have some blood in her urine when she does not drink enough fluids.  I advised that she is on diuretics and that is probably hard on her kidneys.  She goes to PCP next week and will get a U/A.  She will continue her Eliquis for now.  She denies any other signs of bleeding or cerebral ischemic events.     We went over carotid artery us and BLE arterial us.     Current Outpatient Prescriptions   Medication Sig Dispense Refill   • apixaban (ELIQUIS) 5 MG tablet tablet Take 1 tablet by mouth 2 (Two) Times a Day. 60 tablet 5   • furosemide (LASIX) 40 MG tablet Take 20 mg by mouth Daily.     • hydrochlorothiazide (HYDRODIURIL) 12.5 MG tablet Take 1 tablet by mouth Daily. (Patient taking differently: Take 12.5 mg by mouth Daily. Every other day) 30 tablet 4   • methIMAzole  (TAPAZOLE) 10 MG tablet Take 10 mg by mouth Daily.     • metoprolol tartrate (LOPRESSOR) 50 MG tablet Take 1 tablet by mouth Every 12 (Twelve) Hours. 60 tablet 5   • potassium chloride (K-DUR) 10 MEQ CR tablet Take 1 tablet by mouth Daily. 30 tablet 3     No current facility-administered medications for this visit.        ALLERGIES    Naproxen    Past Medical History:   Diagnosis Date   • A-fib    • CHF (congestive heart failure)    • Diabetes mellitus    • Disease of thyroid gland    • DVT (deep venous thrombosis)     hx   • Pulmonary HTN     severe       Social History     Social History   • Marital status:      Spouse name: N/A   • Number of children: N/A   • Years of education: N/A     Occupational History   • Not on file.     Social History Main Topics   • Smoking status: Former Smoker     Packs/day: 1.00     Types: Cigarettes     Quit date: 1993   • Smokeless tobacco: Not on file   • Alcohol use No   • Drug use: No   • Sexual activity: Defer     Other Topics Concern   • Not on file     Social History Narrative       Family History   Problem Relation Age of Onset   • Heart disease Father    • Heart disease Sister    • Heart disease Brother        Review of Systems   Constitutional: Positive for diaphoresis and fatigue.   HENT: Positive for rhinorrhea. Negative for sneezing.    Eyes: Negative for visual disturbance.   Respiratory: Negative for chest tightness and shortness of breath.    Cardiovascular: Positive for leg swelling (no change ). Negative for chest pain and palpitations.   Gastrointestinal: Negative for nausea and vomiting.   Endocrine: Negative.    Genitourinary: Positive for hematuria (if doesn't drink enough fluid  ).   Musculoskeletal: Positive for arthralgias. Negative for back pain and neck pain.   Skin: Negative.    Allergic/Immunologic: Positive for environmental allergies.   Neurological: Negative for dizziness, syncope and light-headedness.   Hematological: Bruises/bleeds easily.  "  Psychiatric/Behavioral: Negative.        Objective   /71 (BP Location: Left arm, Patient Position: Sitting)  Pulse 78  Ht 61\" (154.9 cm)  Wt 207 lb 3.2 oz (94 kg)  SpO2 96%  BMI 39.15 kg/m2  Lab Results (most recent)     None        Physical Exam   Constitutional: She is oriented to person, place, and time. Vital signs are normal. She appears well-developed and well-nourished. She is active and cooperative.   HENT:   Head: Normocephalic.   Mouth/Throat: Abnormal dentition (edentulous upper ).   Eyes: Lids are normal.   Neck: Normal carotid pulses, no hepatojugular reflux and no JVD present. Carotid bruit is present (left carotid bruit ).   Cardiovascular: Normal rate and regular rhythm.    Murmur heard.   Systolic (RUSB and LUSB ) murmur is present   Pulses:       Radial pulses are 2+ on the right side, and 2+ on the left side.   Nonpitting edema BLE; PVD changes BLE.  Unable to palpate BLE.    Pulmonary/Chest: Effort normal and breath sounds normal.   Abdominal: Normal appearance and bowel sounds are normal.   Neurological: She is alert and oriented to person, place, and time.   Skin: Skin is warm, dry and intact.   Psychiatric: She has a normal mood and affect. Her speech is normal and behavior is normal. Judgment and thought content normal. Cognition and memory are normal.       Procedure   Procedures         Assessment/Plan      Diagnosis Plan   1. Persistent atrial fibrillation     2. Essential hypertension     3. Peripheral edema     4. DEEPAK (obstructive sleep apnea)         Return in about 6 months (around 12/1/2017).    Atrial Fib - on Eliquis.  Hypertension - managed well.  Peripheral edema - doing well with lasix.  DEEPAK - uses a CPAP.  Patient is doing well from a cardiac standpoint.  She will continue her medication regimen.  She will follow-up with PCP next week to assess blood in urine.  She will follow-up in 6 mos or sooner if any changes.        "

## 2017-09-18 DIAGNOSIS — I10 ESSENTIAL HYPERTENSION: ICD-10-CM

## 2017-09-18 RX ORDER — POTASSIUM CHLORIDE 750 MG/1
10 TABLET, FILM COATED, EXTENDED RELEASE ORAL DAILY
Qty: 30 TABLET | Refills: 5 | Status: SHIPPED | OUTPATIENT
Start: 2017-09-18 | End: 2018-04-10 | Stop reason: SDUPTHER

## 2017-11-13 RX ORDER — METOPROLOL TARTRATE 50 MG/1
TABLET, FILM COATED ORAL
Qty: 60 TABLET | Refills: 5 | Status: SHIPPED | OUTPATIENT
Start: 2017-11-13 | End: 2017-11-20 | Stop reason: SDUPTHER

## 2017-11-17 DIAGNOSIS — I10 ESSENTIAL HYPERTENSION: Primary | ICD-10-CM

## 2017-11-20 DIAGNOSIS — I10 ESSENTIAL HYPERTENSION: Primary | ICD-10-CM

## 2017-11-20 RX ORDER — METOPROLOL TARTRATE 50 MG/1
TABLET, FILM COATED ORAL
Qty: 60 TABLET | Refills: 5 | Status: SHIPPED | OUTPATIENT
Start: 2017-11-20 | End: 2017-12-01 | Stop reason: SDUPTHER

## 2017-11-20 RX ORDER — METOPROLOL TARTRATE 50 MG/1
50 TABLET, FILM COATED ORAL 2 TIMES DAILY
Qty: 180 TABLET | Refills: 3 | Status: SHIPPED | OUTPATIENT
Start: 2017-11-20 | End: 2018-01-30 | Stop reason: DRUGHIGH

## 2017-12-01 ENCOUNTER — OFFICE VISIT (OUTPATIENT)
Dept: CARDIOLOGY | Facility: CLINIC | Age: 62
End: 2017-12-01

## 2017-12-01 VITALS
DIASTOLIC BLOOD PRESSURE: 69 MMHG | HEIGHT: 61 IN | SYSTOLIC BLOOD PRESSURE: 106 MMHG | WEIGHT: 222 LBS | BODY MASS INDEX: 41.91 KG/M2 | HEART RATE: 60 BPM | OXYGEN SATURATION: 99 %

## 2017-12-01 DIAGNOSIS — I10 ESSENTIAL HYPERTENSION: ICD-10-CM

## 2017-12-01 DIAGNOSIS — Z99.89 OSA ON CPAP: ICD-10-CM

## 2017-12-01 DIAGNOSIS — I48.0 PAROXYSMAL ATRIAL FIBRILLATION (HCC): Primary | ICD-10-CM

## 2017-12-01 DIAGNOSIS — G47.33 OSA ON CPAP: ICD-10-CM

## 2017-12-01 DIAGNOSIS — Z00.00 HEALTHCARE MAINTENANCE: ICD-10-CM

## 2017-12-01 PROCEDURE — 99214 OFFICE O/P EST MOD 30 MIN: CPT | Performed by: NURSE PRACTITIONER

## 2017-12-01 PROCEDURE — 93000 ELECTROCARDIOGRAM COMPLETE: CPT | Performed by: NURSE PRACTITIONER

## 2017-12-01 RX ORDER — SPIRONOLACTONE 25 MG/1
25 TABLET ORAL DAILY
COMMUNITY
End: 2018-06-01

## 2017-12-01 NOTE — PROGRESS NOTES
Subjective   Christina Holbrook is a 62 y.o. female     Chief Complaint   Patient presents with   • Atrial Fibrillation   • Hypertension       HPI    Problem List:    1. Persistent Afib (CHADS 2 - Eliquis)  2. Hypertension  2.1 Echo 5/3/16 - EF 60-65%; trace MR; mild TR; PA 58  2.2 Echo 10/6/16 - EF 65%; mild TR; PA 35-45  3. Pulmonary Hypertension  4. Chronic BLE Edema   4.1 BLE Arterial U/S 5/5/17 - no evidence of sig atheroma obst disease at nay level on either side.   5. H/O CHF   6. Chest Pain  6.1 Stress Test 5/3/16 - small fixed defect in mid anterolateral, apical lateral segments; extent of defect mild to mod; post stress EF 40%  6.2 Stress Test 1/4/17 - no ischemia; low risk  7. DEEPAK - CPAP   8. Carotid Artery Disease   8.1 Carotid Artery U/S 5/5/17 - bilateral bifurcation 16-49%; less than 15% both ICA. Antegrade flow both vertebral arteries    She has a 62-year-old female who presents today for follow-up.  He denies any chest pain, pressure, palpitations, fluttering, dizziness, presyncope, syncope, orthopnea or PND.  She says since being on the Tapazole she's noted she's had some increase in weight gain as well as edema.  She says she still had some left over aldactone and she's been taken half a tablet.  She denies any shortness of breath with activity.  She has not had any recent blood work in regards to cholesterol.  She does not want to take any statins.  She says that she did have some female bleeding has been to her OB/GYN and said she had thickening however otherwise she is fine.  She denies any signs of bleeding or cerebral ischemic events.      Current Outpatient Prescriptions   Medication Sig Dispense Refill   • apixaban (ELIQUIS) 5 MG tablet tablet Take 1 tablet by mouth 2 (Two) Times a Day. 60 tablet 5   • furosemide (LASIX) 40 MG tablet Take 40 mg by mouth Daily.     • hydrochlorothiazide (HYDRODIURIL) 12.5 MG tablet Take 1 tablet by mouth Daily. (Patient taking differently: Take 12.5 mg  by mouth Daily. Every other day) 30 tablet 4   • methIMAzole (TAPAZOLE) 10 MG tablet Take 10 mg by mouth Daily.     • metoprolol tartrate (LOPRESSOR) 50 MG tablet Take 1 tablet by mouth 2 (Two) Times a Day. 180 tablet 3   • potassium chloride (K-DUR) 10 MEQ CR tablet Take 1 tablet by mouth Daily. 30 tablet 5   • spironolactone (ALDACTONE) 25 MG tablet Take 25 mg by mouth Daily. Take 1/2 tablet as needed for edema       No current facility-administered medications for this visit.        ALLERGIES    Naproxen    Past Medical History:   Diagnosis Date   • A-fib    • CHF (congestive heart failure)    • Diabetes mellitus    • Disease of thyroid gland    • DVT (deep venous thrombosis)     hx   • Essential hypertension 12/1/2017   • Pulmonary HTN     severe       Social History     Social History   • Marital status:      Spouse name: N/A   • Number of children: N/A   • Years of education: N/A     Occupational History   • Not on file.     Social History Main Topics   • Smoking status: Former Smoker     Packs/day: 1.00     Types: Cigarettes     Quit date: 1993   • Smokeless tobacco: Not on file   • Alcohol use No   • Drug use: No   • Sexual activity: Defer     Other Topics Concern   • Not on file     Social History Narrative       Family History   Problem Relation Age of Onset   • Heart disease Father    • Heart disease Sister    • Heart disease Brother        Review of Systems   Constitutional: Negative for diaphoresis and fatigue.   HENT: Positive for rhinorrhea. Negative for sneezing.    Eyes: Negative for visual disturbance.   Respiratory: Negative for chest tightness and shortness of breath.    Cardiovascular: Positive for leg swelling. Negative for chest pain and palpitations.   Gastrointestinal: Positive for nausea (sometimes ). Negative for vomiting.   Genitourinary: Positive for menstrual problem (some bleeding, but followed up with OBGYN). Negative for difficulty urinating.   Musculoskeletal: Positive for  "arthralgias and back pain. Negative for neck pain.   Skin: Negative.    Allergic/Immunologic: Positive for environmental allergies.   Neurological: Negative for dizziness, syncope and light-headedness.   Hematological: Negative for adenopathy. Bruises/bleeds easily.   Psychiatric/Behavioral: Negative.        Objective   /69 (BP Location: Left arm, Patient Position: Sitting)  Pulse 60  Ht 61\" (154.9 cm)  Wt 222 lb (101 kg)  SpO2 99%  BMI 41.95 kg/m2  Vitals:    12/01/17 0813   BP: 106/69   BP Location: Left arm   Patient Position: Sitting   Pulse: 60   SpO2: 99%   Weight: 222 lb (101 kg)   Height: 61\" (154.9 cm)      Lab Results (most recent)     None        Physical Exam   Constitutional: She is oriented to person, place, and time. Vital signs are normal. She appears well-developed and well-nourished. She is active and cooperative.   HENT:   Head: Normocephalic.   Mouth/Throat: Abnormal dentition (edentulous upper ).   Eyes: Lids are normal.   Neck: Normal carotid pulses, no hepatojugular reflux and no JVD present. Carotid bruit is present (left ).   Cardiovascular: An irregularly irregular rhythm present. Bradycardia present.    Murmur heard.   Systolic (RUSB and LUSB ) murmur is present with a grade of 1/6   Pulses:       Radial pulses are 2+ on the right side, and 2+ on the left side.        Dorsalis pedis pulses are 2+ on the right side, and 2+ on the left side.        Posterior tibial pulses are 2+ on the right side, and 2+ on the left side.   Non-pitting edema BLE; compression socks on; PVD changes BLE   Pulmonary/Chest: Effort normal and breath sounds normal.   Abdominal: Normal appearance and bowel sounds are normal.   Neurological: She is alert and oriented to person, place, and time.   Skin: Skin is warm, dry and intact.   Psychiatric: She has a normal mood and affect. Her speech is normal and behavior is normal. Judgment and thought content normal. Cognition and memory are normal. "       Procedure     ECG 12 Lead  Date/Time: 12/1/2017 8:27 AM  Performed by: YNES MUNIZ  Authorized by: YNES MUNIZ   Comparison: compared with previous ECG from 12/13/2016  Similar to previous ECG  Rhythm: atrial fibrillation  Rate: bradycardic  BPM: 59  QRS axis: normal  Clinical impression: abnormal ECG and low voltage                 Assessment/Plan      Diagnosis Plan   1. Paroxysmal atrial fibrillation  Lipid Panel    Comprehensive Metabolic Panel    Magnesium   2. Essential hypertension  Lipid Panel    Comprehensive Metabolic Panel    Magnesium   3. DEEPAK on CPAP     4. Healthcare maintenance  Lipid Panel    Comprehensive Metabolic Panel    Magnesium       Return in about 6 months (around 6/1/2018).    A. fib-patient is on Eliquis and metoprolol.  Hypertension-patient doing well.  DEEPAK-patient's complaint with CPAP.  She will get lipids, CMP and magnesium.  She'll continue her medication regimen.  She'll follow-up in 6 months or sooner if any changes.       Electronically signed by:

## 2017-12-01 NOTE — PATIENT INSTRUCTIONS
Atrial Fibrillation  Atrial fibrillation is a type of irregular or rapid heartbeat (arrhythmia). In atrial fibrillation, the heart quivers continuously in a chaotic pattern. This occurs when parts of the heart receive disorganized signals that make the heart unable to pump blood normally. This can increase the risk for stroke, heart failure, and other heart-related conditions. There are different types of atrial fibrillation, including:  · Paroxysmal atrial fibrillation. This type starts suddenly, and it usually stops on its own shortly after it starts.  · Persistent atrial fibrillation. This type often lasts longer than a week. It may stop on its own or with treatment.  · Long-lasting persistent atrial fibrillation. This type lasts longer than 12 months.  · Permanent atrial fibrillation. This type does not go away.  Talk with your health care provider to learn about the type of atrial fibrillation that you have.  CAUSES  This condition is caused by some heart-related conditions or procedures, including:  · A heart attack.  · Coronary artery disease.  · Heart failure.  · Heart valve conditions.  · High blood pressure.  · Inflammation of the sac that surrounds the heart (pericarditis).  · Heart surgery.  · Certain heart rhythm disorders, such as Parrish-Parkinson-White syndrome.  Other causes include:  · Pneumonia.  · Obstructive sleep apnea.  · Blockage of an artery in the lungs (pulmonary embolism, or PE).  · Lung cancer.  · Chronic lung disease.  · Thyroid problems, especially if the thyroid is overactive (hyperthyroidism).  · Caffeine.  · Excessive alcohol use or illegal drug use.  · Use of some medicines, including certain decongestants and diet pills.  Sometimes, the cause cannot be found.  RISK FACTORS  This condition is more likely to develop in:  · People who are older in age.  · People who smoke.  · People who have diabetes mellitus.  · People who are overweight (obese).  · Athletes who exercise  vigorously.  SYMPTOMS  Symptoms of this condition include:  · A feeling that your heart is beating rapidly or irregularly.  · A feeling of discomfort or pain in your chest.  · Shortness of breath.  · Sudden light-headedness or weakness.  · Getting tired easily during exercise.  In some cases, there are no symptoms.  DIAGNOSIS  Your health care provider may be able to detect atrial fibrillation when taking your pulse. If detected, this condition may be diagnosed with:  · An electrocardiogram (ECG).  · A Holter monitor test that records your heartbeat patterns over a 24-hour period.  · Transthoracic echocardiogram (TTE) to evaluate how blood flows through your heart.  · Transesophageal echocardiogram (LEONILA) to view more detailed images of your heart.  · A stress test.  · Imaging tests, such as a CT scan or chest X-ray.  · Blood tests.  TREATMENT  The main goals of treatment are to prevent blood clots from forming and to keep your heart beating at a normal rate and rhythm. The type of treatment that you receive depends on many factors, such as your underlying medical conditions and how you feel when you are experiencing atrial fibrillation.  This condition may be treated with:  · Medicine to slow down the heart rate, bring the heart's rhythm back to normal, or prevent clots from forming.  · Electrical cardioversion. This is a procedure that resets your heart's rhythm by delivering a controlled, low-energy shock to the heart through your skin.  · Different types of ablation, such as catheter ablation, catheter ablation with pacemaker, or surgical ablation. These procedures destroy the heart tissues that send abnormal signals. When the pacemaker is used, it is placed under your skin to help your heart beat in a regular rhythm.  HOME CARE INSTRUCTIONS  · Take over-the counter and prescription medicines only as told by your health care provider.  · If your health care provider prescribed a blood-thinning medicine  (anticoagulant), take it exactly as told. Taking too much blood-thinning medicine can cause bleeding. If you do not take enough blood-thinning medicine, you will not have the protection that you need against stroke and other problems.  · Do not use tobacco products, including cigarettes, chewing tobacco, and e-cigarettes. If you need help quitting, ask your health care provider.  · If you have obstructive sleep apnea, manage your condition as told by your health care provider.  · Do not drink alcohol.  · Do not drink beverages that contain caffeine, such as coffee, soda, and tea.  · Maintain a healthy weight. Do not use diet pills unless your health care provider approves. Diet pills may make heart problems worse.  · Follow diet instructions as told by your health care provider.  · Exercise regularly as told by your health care provider.  · Keep all follow-up visits as told by your health care provider. This is important.  PREVENTION  · Avoid drinking beverages that contain caffeine or alcohol.  · Avoid certain medicines, especially medicines that are used for breathing problems.  · Avoid certain herbs and herbal medicines, such as those that contain ephedra or ginseng.  · Do not use illegal drugs, such as cocaine and amphetamines.  · Do not smoke.  · Manage your high blood pressure.  SEEK MEDICAL CARE IF:  · You notice a change in the rate, rhythm, or strength of your heartbeat.  · You are taking an anticoagulant and you notice increased bruising.  · You tire more easily when you exercise or exert yourself.  SEEK IMMEDIATE MEDICAL CARE IF:  · You have chest pain, abdominal pain, sweating, or weakness.  · You feel nauseous.  · You notice blood in your vomit, bowel movement, or urine.  · You have shortness of breath.  · You suddenly have swollen feet and ankles.  · You feel dizzy.  · You have sudden weakness or numbness of the face, arm, or leg, especially on one side of the body.  · You have trouble speaking,  trouble understanding, or both (aphasia).  · Your face or your eyelid droops on one side.  These symptoms may represent a serious problem that is an emergency. Do not wait to see if the symptoms will go away. Get medical help right away. Call your local emergency services (911 in the [place] Do not drive yourself to the hospital.     This information is not intended to replace advice given to you by your health care provider. Make sure you discuss any questions you have with your health care provider.     Document Released: 12/18/2006 Document Revised: 09/07/2016 Document Reviewed: 04/13/2016  [employer] Interactive Patient Education ©2017 [employer]    Edema  Edema is an abnormal buildup of fluids in your body tissues. Edema is somewhat dependent on gravity to pull the fluid to the lowest place in your body. That makes the condition more common in the legs and thighs (lower extremities). Painless swelling of the feet and ankles is common and becomes more likely as you get older. It is also common in looser tissues, like around your eyes.   When the affected area is squeezed, the fluid may move out of that spot and leave a dent for a few moments. This dent is called pitting.   CAUSES   There are many possible causes of edema. Eating too much salt and being on your feet or sitting for a long time can cause edema in your legs and ankles. Hot weather may make edema worse. Common medical causes of edema include:  · Heart failure.  · Liver disease.  · Kidney disease.  · Weak blood vessels in your legs.  · Cancer.  · An injury.  · Pregnancy.  · Some medications.  · Obesity.   SYMPTOMS   Edema is usually painless. Your skin may look swollen or shiny.   DIAGNOSIS   Your health care provider may be able to diagnose edema by asking about your medical history and doing a physical exam. You may need to have tests such as X-rays, an electrocardiogram, or blood tests to check for medical conditions that may cause edema.    TREATMENT   Edema treatment depends on the cause. If you have heart, liver, or kidney disease, you need the treatment appropriate for these conditions. General treatment may include:  · Elevation of the affected body part above the level of your heart.  · Compression of the affected body part. Pressure from elastic bandages or support stockings squeezes the tissues and forces fluid back into the blood vessels. This keeps fluid from entering the tissues.  · Restriction of fluid and salt intake.  · Use of a water pill (diuretic). These medications are appropriate only for some types of edema. They pull fluid out of your body and make you urinate more often. This gets rid of fluid and reduces swelling, but diuretics can have side effects. Only use diuretics as directed by your health care provider.  HOME CARE INSTRUCTIONS   · Keep the affected body part above the level of your heart when you are lying down.    · Do not sit still or stand for prolonged periods.    · Do not put anything directly under your knees when lying down.  · Do not wear constricting clothing or garters on your upper legs.    · Exercise your legs to work the fluid back into your blood vessels. This may help the swelling go down.    · Wear elastic bandages or support stockings to reduce ankle swelling as directed by your health care provider.    · Eat a low-salt diet to reduce fluid if your health care provider recommends it.    · Only take medicines as directed by your health care provider.   SEEK MEDICAL CARE IF:   · Your edema is not responding to treatment.  · You have heart, liver, or kidney disease and notice symptoms of edema.  · You have edema in your legs that does not improve after elevating them.    · You have sudden and unexplained weight gain.  SEEK IMMEDIATE MEDICAL CARE IF:   · You develop shortness of breath or chest pain.    · You cannot breathe when you lie down.  · You develop pain, redness, or warmth in the swollen areas.     · You have heart, liver, or kidney disease and suddenly get edema.  · You have a fever and your symptoms suddenly get worse.  MAKE SURE YOU:   · Understand these instructions.  · Will watch your condition.  · Will get help right away if you are not doing well or get worse.     This information is not intended to replace advice given to you by your health care provider. Make sure you discuss any questions you have with your health care provider.     Document Released: 12/18/2006 Document Revised: 04/10/2017 Document Reviewed: 10/10/2014  [employer] Interactive Patient Education ©2017 [employer]

## 2017-12-21 ENCOUNTER — TELEPHONE (OUTPATIENT)
Dept: CARDIOLOGY | Facility: CLINIC | Age: 62
End: 2017-12-21

## 2017-12-21 DIAGNOSIS — R07.2 PRECORDIAL PAIN: Primary | ICD-10-CM

## 2017-12-21 RX ORDER — NITROGLYCERIN 0.4 MG/1
TABLET SUBLINGUAL
Qty: 25 TABLET | Refills: 3 | Status: SHIPPED | OUTPATIENT
Start: 2017-12-21 | End: 2018-12-29 | Stop reason: SDUPTHER

## 2017-12-21 NOTE — TELEPHONE ENCOUNTER
PATIENT STATES SHE HAS HAD SEVERAL EPISODES OF CHEST PAIN WITH RADIATION TO HER JAW TODAY. DOES NOT HAVE ANY SL NTG. PER SHAUN SHAIKH, PAC SEND IN SL NTG. 0.4 MG PER STANDARD PROTOCOL/INSTRUCTIONS. PATIENT INSTRUCTED ON NITRO USAGE, TO GO TO ER IF C/P NOT RELIEVED AND /OR IF HAS HAD TO TAKE 3 TABS. SHE IS ALSO TO CALLTHE OFFICE BACK IF CHEST PAIN PERSISTS AND MAY NEED A SOONER APPT. FOR EVAL. VERBALIZED SHE UNDERSTOOD ALL ABOVE. PH,LPN

## 2017-12-21 NOTE — TELEPHONE ENCOUNTER
----- Message from Yovana Blackwell LPN sent at 12/21/2017  4:26 PM EST -----   Has questions for a nurse.

## 2018-01-29 DIAGNOSIS — R60.9 PERIPHERAL EDEMA: ICD-10-CM

## 2018-01-29 RX ORDER — FUROSEMIDE 40 MG/1
TABLET ORAL
Qty: 30 TABLET | Refills: 5 | Status: SHIPPED | OUTPATIENT
Start: 2018-01-29 | End: 2018-12-12 | Stop reason: SDUPTHER

## 2018-01-30 DIAGNOSIS — I48.91 ATRIAL FIBRILLATION, UNSPECIFIED TYPE (HCC): ICD-10-CM

## 2018-01-30 DIAGNOSIS — I95.9 HYPOTENSION, UNSPECIFIED HYPOTENSION TYPE: ICD-10-CM

## 2018-01-30 DIAGNOSIS — R60.0 BILATERAL LEG EDEMA: ICD-10-CM

## 2018-01-30 DIAGNOSIS — R07.2 PRECORDIAL PAIN: Primary | ICD-10-CM

## 2018-01-30 DIAGNOSIS — I10 ESSENTIAL HYPERTENSION: ICD-10-CM

## 2018-01-30 RX ORDER — METOPROLOL TARTRATE 50 MG/1
TABLET, FILM COATED ORAL
Qty: 180 TABLET | Refills: 3
Start: 2018-01-30 | End: 2018-02-27 | Stop reason: DRUGHIGH

## 2018-01-30 NOTE — TELEPHONE ENCOUNTER
Patient returning call from e-mail message this am. Patient states she has been having LE edema and some in abd. and doesn't think she can come off of the lasix. She stated has been having to take some nitro tabs, not every day and they help when she takes them. She also has the following c/o's: mild nosebleed, h/a left side, and mild left cheek numbness. I told patient to contact her family pcp regarding complaints above. Per Jeana Gonsalez NP decrease metoprolol to 25 mg in pm but continue 50 mg in am, order lexiscan stress test and echo Dx: LE edema, c/p, htn. And contact office in a week or so with b/p & h/r readings and how she is doing. Patient verbalized she understood all above. PH,LPN

## 2018-02-22 ENCOUNTER — HOSPITAL ENCOUNTER (OUTPATIENT)
Dept: CARDIOLOGY | Facility: HOSPITAL | Age: 63
Discharge: HOME | End: 2018-02-22

## 2018-02-22 ENCOUNTER — OUTSIDE FACILITY SERVICE (OUTPATIENT)
Dept: CARDIOLOGY | Facility: CLINIC | Age: 63
End: 2018-02-22

## 2018-02-22 DIAGNOSIS — I48.91 ATRIAL FIBRILLATION, UNSPECIFIED TYPE (HCC): ICD-10-CM

## 2018-02-22 DIAGNOSIS — R07.2 PRECORDIAL PAIN: ICD-10-CM

## 2018-02-22 DIAGNOSIS — R60.0 BILATERAL LEG EDEMA: ICD-10-CM

## 2018-02-22 DIAGNOSIS — I10 ESSENTIAL HYPERTENSION: ICD-10-CM

## 2018-02-22 LAB
MAXIMAL PREDICTED HEART RATE: 160 BPM
MAXIMAL PREDICTED HEART RATE: 160 BPM
STRESS TARGET HR: 136 BPM
STRESS TARGET HR: 136 BPM

## 2018-02-22 PROCEDURE — 25010000002 REGADENOSON 0.4 MG/5ML SOLUTION: Performed by: INTERNAL MEDICINE

## 2018-02-22 PROCEDURE — 93018 CV STRESS TEST I&R ONLY: CPT | Performed by: INTERNAL MEDICINE

## 2018-02-22 PROCEDURE — 78452 HT MUSCLE IMAGE SPECT MULT: CPT

## 2018-02-22 PROCEDURE — 78452 HT MUSCLE IMAGE SPECT MULT: CPT | Performed by: INTERNAL MEDICINE

## 2018-02-22 PROCEDURE — 0 TECHNETIUM SESTAMIBI: Performed by: INTERNAL MEDICINE

## 2018-02-22 PROCEDURE — 93306 TTE W/DOPPLER COMPLETE: CPT

## 2018-02-22 PROCEDURE — 93017 CV STRESS TEST TRACING ONLY: CPT

## 2018-02-22 PROCEDURE — A9500 TC99M SESTAMIBI: HCPCS | Performed by: INTERNAL MEDICINE

## 2018-02-22 PROCEDURE — 93306 TTE W/DOPPLER COMPLETE: CPT | Performed by: INTERNAL MEDICINE

## 2018-02-22 RX ADMIN — TECHNETIUM TC 99M SESTAMIBI 1 DOSE: 1 INJECTION INTRAVENOUS at 09:15

## 2018-02-22 RX ADMIN — REGADENOSON 0.4 MG: 0.08 INJECTION, SOLUTION INTRAVENOUS at 09:15

## 2018-02-27 ENCOUNTER — TELEPHONE (OUTPATIENT)
Dept: CARDIOLOGY | Facility: CLINIC | Age: 63
End: 2018-02-27

## 2018-02-27 DIAGNOSIS — R60.1 GENERALIZED EDEMA: Primary | ICD-10-CM

## 2018-02-27 DIAGNOSIS — I10 ESSENTIAL HYPERTENSION: ICD-10-CM

## 2018-02-27 RX ORDER — FUROSEMIDE 40 MG/1
40 TABLET ORAL DAILY
Qty: 30 TABLET | Refills: 5 | Status: SHIPPED | OUTPATIENT
Start: 2018-02-27 | End: 2018-06-01

## 2018-02-27 NOTE — TELEPHONE ENCOUNTER
----- Message from CACHORRO Perera sent at 2/27/2018  3:58 PM EST -----  Have her decrease Metoprolol to 12.5 mg in PM and and 25 mg in AM.  Has she lost anymore weight?  Also any symptoms of possible UTI or being sick.    ----- Message -----     From: Christina Lynch MA     Sent: 2/27/2018   3:38 PM       To: CACHORRO Perera    I called the patient about her stress and echo and she stated that her blood pressure has been running 88/50.... She states she is getting ready to send us her blood pressures over email so we would have those.... She has had no medication changes. I explained to her you would want to see those before  You make any medication changes. She verbalized understanding.

## 2018-03-16 RX ORDER — SPIRONOLACTONE 25 MG/1
TABLET ORAL
Qty: 30 TABLET | Refills: 6 | OUTPATIENT
Start: 2018-03-16

## 2018-04-10 DIAGNOSIS — I10 ESSENTIAL HYPERTENSION: ICD-10-CM

## 2018-04-10 RX ORDER — POTASSIUM CHLORIDE 750 MG/1
TABLET, FILM COATED, EXTENDED RELEASE ORAL
Qty: 30 TABLET | Refills: 5 | Status: SHIPPED | OUTPATIENT
Start: 2018-04-10 | End: 2018-11-14 | Stop reason: SDUPTHER

## 2018-06-01 ENCOUNTER — OFFICE VISIT (OUTPATIENT)
Dept: CARDIOLOGY | Facility: CLINIC | Age: 63
End: 2018-06-01

## 2018-06-01 VITALS
HEIGHT: 61 IN | HEART RATE: 93 BPM | OXYGEN SATURATION: 96 % | DIASTOLIC BLOOD PRESSURE: 79 MMHG | SYSTOLIC BLOOD PRESSURE: 123 MMHG | WEIGHT: 228 LBS | BODY MASS INDEX: 43.05 KG/M2

## 2018-06-01 DIAGNOSIS — Z00.00 HEALTHCARE MAINTENANCE: ICD-10-CM

## 2018-06-01 DIAGNOSIS — E78.5 HYPERLIPIDEMIA, UNSPECIFIED HYPERLIPIDEMIA TYPE: ICD-10-CM

## 2018-06-01 DIAGNOSIS — I77.9 BILATERAL CAROTID ARTERY DISEASE (HCC): ICD-10-CM

## 2018-06-01 DIAGNOSIS — G47.33 OSA ON CPAP: ICD-10-CM

## 2018-06-01 DIAGNOSIS — I10 ESSENTIAL HYPERTENSION: ICD-10-CM

## 2018-06-01 DIAGNOSIS — Z99.89 OSA ON CPAP: ICD-10-CM

## 2018-06-01 DIAGNOSIS — I48.0 PAROXYSMAL ATRIAL FIBRILLATION (HCC): Primary | ICD-10-CM

## 2018-06-01 PROCEDURE — 99214 OFFICE O/P EST MOD 30 MIN: CPT | Performed by: NURSE PRACTITIONER

## 2018-06-01 RX ORDER — LOVASTATIN 20 MG/1
20 TABLET ORAL
Qty: 30 TABLET | Refills: 11 | Status: SHIPPED | OUTPATIENT
Start: 2018-06-01 | End: 2018-12-12 | Stop reason: SDUPTHER

## 2018-06-01 NOTE — PROGRESS NOTES
Subjective   Christina Holbrook is a 62 y.o. female     Chief Complaint   Patient presents with   • Hypertension     presents as a follow up   • Atrial Fibrillation       HPI    Problem List:    1. Persistent Afib (CHADS 2 - Eliquis)  2. Hypertension  2.1 Echo 5/3/16 - EF 60-65%; trace MR; mild TR; PA 58  2.2 Echo 10/6/16 - EF 65%; mild TR; PA 35-45  2.3 Echo 2/22/18-mild LVH, EF greater than 65%, diastolic dysfunction 2, mild to moderate MR, mild TR, mild AR, PA 40-45  3. Pulmonary Hypertension  4. Chronic BLE Edema   4.1 BLE Arterial U/S 5/5/17 - no evidence of sig atheroma obst disease at nay level on either side.   5. H/O CHF   6. Chest Pain  6.1 Stress Test 5/3/16 - small fixed defect in mid anterolateral, apical lateral segments; extent of defect mild to mod; post stress EF 40%  6.2 Stress Test 1/4/17 - no ischemia; low risk  6.3 Stress test 2/22/18-no ischemia, low risk  7. DEEPAK - CPAP   8. Carotid Artery Disease   8.1 Carotid Artery U/S 5/5/17 - bilateral bifurcation 16-49%; less than 15% both ICA. Antegrade flow both vertebral arteries    Patient is a 62-year-old female who presents today for follow-up on testing.  He denies any chest pain, pressure, palpitations, fluttering, presyncope, syncope, orthopnea or PND.  She does get dizzy/lightheaded when her blood pressure is low.  She still has swelling in her legs and this has not changed.  She denies any shortness of breath with activity.  She denies any signs of bleeding or cerebral ischemic events.  PCP monitors her cholesterol.  Patient is on been taken her HCTZ every other day so she will go and stopped that and just continue with her Lasix she can take an additional half tablet she needs to for swelling has increased.    Current Outpatient Prescriptions   Medication Sig Dispense Refill   • apixaban (ELIQUIS) 5 MG tablet tablet Take 1 tablet by mouth 2 (Two) Times a Day. 60 tablet 5   • furosemide (LASIX) 40 MG tablet TAKE ONE TABLET BY MOUTH ONCE  DAILY 30 tablet 5   • methIMAzole (TAPAZOLE) 10 MG tablet Take 2.5 mg by mouth Daily.     • metoprolol tartrate (LOPRESSOR) 25 MG tablet Take 1 tablet by mouth 2 (Two) Times a Day. 1 tab in am 1/2 tab in pm 60 tablet 5   • nitroglycerin (NITROSTAT) 0.4 MG SL tablet 1 under the tongue as needed for angina, may repeat q5mins for up three doses with a max of 3 tabs within 15 min. 25 tablet 3   • potassium chloride (K-DUR) 10 MEQ CR tablet TAKE ONE TABLET BY MOUTH ONCE DAILY 30 tablet 5   • lovastatin (MEVACOR) 20 MG tablet Take 1 tablet by mouth Daily With Dinner. 30 tablet 11     No current facility-administered medications for this visit.        ALLERGIES    Naproxen    Past Medical History:   Diagnosis Date   • A-fib    • CHF (congestive heart failure)    • Diabetes mellitus    • Disease of thyroid gland    • DVT (deep venous thrombosis)     hx   • Essential hypertension 12/1/2017   • Pulmonary HTN     severe       Social History     Social History   • Marital status:      Spouse name: N/A   • Number of children: N/A   • Years of education: N/A     Occupational History   • Not on file.     Social History Main Topics   • Smoking status: Former Smoker     Packs/day: 1.00     Types: Cigarettes     Quit date: 1993   • Smokeless tobacco: Never Used   • Alcohol use No   • Drug use: No   • Sexual activity: Defer     Other Topics Concern   • Not on file     Social History Narrative   • No narrative on file       Family History   Problem Relation Age of Onset   • Heart disease Father    • Heart disease Sister    • Heart disease Brother        Review of Systems   Constitutional: Negative for diaphoresis and fatigue.   HENT: Positive for rhinorrhea and tinnitus ( at times). Negative for sneezing.    Eyes: Negative for visual disturbance.   Respiratory: Negative for chest tightness and shortness of breath.    Cardiovascular: Positive for leg swelling (No change ). Negative for chest pain and palpitations.  "  Gastrointestinal: Negative for constipation, diarrhea, nausea and vomiting.   Endocrine: Negative.    Genitourinary: Negative for difficulty urinating.   Musculoskeletal: Positive for arthralgias ( knee pain), back pain (low back ) and myalgias. Negative for neck pain.   Skin: Negative.    Allergic/Immunologic: Positive for environmental allergies. Negative for food allergies.   Neurological: Positive for dizziness (when BP low ) and light-headedness ( when blood pressure is low. ). Negative for syncope.   Hematological: Bruises/bleeds easily.   Psychiatric/Behavioral: The patient is not nervous/anxious.        Objective   /79 (BP Location: Left arm, Patient Position: Sitting)   Pulse 93   Ht 154.9 cm (61\")   Wt 103 kg (228 lb)   SpO2 96%   BMI 43.08 kg/m²   Vitals:    06/01/18 0842   BP: 123/79   BP Location: Left arm   Patient Position: Sitting   Pulse: 93   SpO2: 96%   Weight: 103 kg (228 lb)   Height: 154.9 cm (61\")      Lab Results (most recent)     None        Physical Exam   Constitutional: She is oriented to person, place, and time. Vital signs are normal. She appears well-developed and well-nourished. She is active and cooperative.   HENT:   Head: Normocephalic.   Mouth/Throat: Abnormal dentition (upper edentulous ).   Eyes: Lids are normal.   Neck: Normal carotid pulses, no hepatojugular reflux and no JVD present. Carotid bruit is not present.   Cardiovascular: Normal rate.  An irregularly irregular rhythm present.   Murmur heard.   Systolic (RUSB and LUSB ) murmur is present with a grade of 1/6   Pulses:       Radial pulses are 2+ on the right side, and 2+ on the left side.        Dorsalis pedis pulses are 2+ on the right side, and 2+ on the left side.        Posterior tibial pulses are 2+ on the right side, and 2+ on the left side.   Trace edema BLE; uses compression socks; PVD changes.     Pulmonary/Chest: Effort normal and breath sounds normal.   Abdominal: Normal appearance and bowel " sounds are normal.   Neurological: She is alert and oriented to person, place, and time.   Skin: Skin is warm, dry and intact.   Psychiatric: She has a normal mood and affect. Her speech is normal and behavior is normal. Judgment and thought content normal. Cognition and memory are normal.       Procedure   Procedures         Assessment/Plan      Diagnosis Plan   1. Paroxysmal atrial fibrillation     2. Essential hypertension  Comprehensive Metabolic Panel   3. DEEPAK on CPAP     4. Bilateral carotid artery disease  lovastatin (MEVACOR) 20 MG tablet    Lipid Panel   5. Healthcare maintenance  Lipid Panel    Comprehensive Metabolic Panel   6. Hyperlipidemia, unspecified hyperlipidemia type  Lipid Panel       Return in about 3 months (around 9/1/2018).         A. fib-patient is on Eliquis and beta blocker.  Hypertension-patient doing well.  DEEPAK-patient has not always been compliant with CPAP device needs to do this due to pulmonary hypertension.  Bilateral carotid artery disease-patient is on statin and Eliquis.  Hyperlipidemia-patient will start lovastatin.  She will get a CMP and lipids in 6 weeks.  Patient found out that her insurance has not been for any of her medications therefore we will provide her with Eliquis samples for now and ice and lovastatin visits on the $4 list at [facility].  She will follow-up in 3 months or sooner if any changes.      Patient's Body mass index is 43.08 kg/m². BMI is above normal parameters. Recommendations include: educational material.      Electronically signed by:

## 2018-06-01 NOTE — PATIENT INSTRUCTIONS
Obesity, Adult  Obesity is the condition of having too much total body fat. Being overweight or obese means that your weight is greater than what is considered healthy for your body size. Obesity is determined by a measurement called BMI. BMI is an estimate of body fat and is calculated from height and weight. For adults, a BMI of 30 or higher is considered obese.  Obesity can eventually lead to other health concerns and major illnesses, including:  · Stroke.  · Coronary artery disease (CAD).  · Type 2 diabetes.  · Some types of cancer, including cancers of the colon, breast, uterus, and gallbladder.  · Osteoarthritis.  · High blood pressure (hypertension).  · High cholesterol.  · Sleep apnea.  · Gallbladder stones.  · Infertility problems.  What are the causes?  The main cause of obesity is taking in (consuming) more calories than your body uses for energy. Other factors that contribute to this condition may include:  · Being born with genes that make you more likely to become obese.  · Having a medical condition that causes obesity. These conditions include:  ¨ Hypothyroidism.  ¨ Polycystic ovarian syndrome (PCOS).  ¨ Binge-eating disorder.  ¨ Cushing syndrome.  · Taking certain medicines, such as steroids, antidepressants, and seizure medicines.  · Not being physically active (sedentary lifestyle).  · Living where there are limited places to exercise safely or buy healthy foods.  · Not getting enough sleep.  What increases the risk?  The following factors may increase your risk of this condition:  · Having a family history of obesity.  · Being a woman of -American descent.  · Being a man of  descent.  What are the signs or symptoms?  Having excessive body fat is the main symptom of this condition.  How is this diagnosed?  This condition may be diagnosed based on:  · Your symptoms.  · Your medical history.  · A physical exam. Your health care provider may measure:  ¨ Your BMI. If you are an adult  with a BMI between 25 and less than 30, you are considered overweight. If you are an adult with a BMI of 30 or higher, you are considered obese.  ¨ The distances around your hips and your waist (circumferences). These may be compared to each other to help diagnose your condition.  ¨ Your skinfold thickness. Your health care provider may gently pinch a fold of your skin and measure it.  How is this treated?  Treatment for this condition often includes changing your lifestyle. Treatment may include some or all of the following:  · Dietary changes. Work with your health care provider and a dietitian to set a weight-loss goal that is healthy and reasonable for you. Dietary changes may include eating:  ¨ Smaller portions. A portion size is the amount of a particular food that is healthy for you to eat at one time. This varies from person to person.  ¨ Low-calorie or low-fat options.  ¨ More whole grains, fruits, and vegetables.  · Regular physical activity. This may include aerobic activity (cardio) and strength training.  · Medicine to help you lose weight. Your health care provider may prescribe medicine if you are unable to lose 1 pound a week after 6 weeks of eating more healthily and doing more physical activity.  · Surgery. Surgical options may include gastric banding and gastric bypass. Surgery may be done if:  ¨ Other treatments have not helped to improve your condition.  ¨ You have a BMI of 40 or higher.  ¨ You have life-threatening health problems related to obesity.  Follow these instructions at home:     Eating and drinking     · Follow recommendations from your health care provider about what you eat and drink. Your health care provider may advise you to:  ¨ Limit fast foods, sweets, and processed snack foods.  ¨ Choose low-fat options, such as low-fat milk instead of whole milk.  ¨ Eat 5 or more servings of fruits or vegetables every day.  ¨ Eat at home more often. This gives you more control over what you  eat.  ¨ Choose healthy foods when you eat out.  ¨ Learn what a healthy portion size is.  ¨ Keep low-fat snacks on hand.  ¨ Avoid sugary drinks, such as soda, fruit juice, iced tea sweetened with sugar, and flavored milk.  ¨ Eat a healthy breakfast.  · Drink enough water to keep your urine clear or pale yellow.  · Do not go without eating for long periods of time (do not fast) or follow a fad diet. Fasting and fad diets can be unhealthy and even dangerous.  Physical Activity   · Exercise regularly, as told by your health care provider. Ask your health care provider what types of exercise are safe for you and how often you should exercise.  · Warm up and stretch before being active.  · Cool down and stretch after being active.  · Rest between periods of activity.  Lifestyle   · Limit the time that you spend in front of your TV, computer, or video game system.  · Find ways to reward yourself that do not involve food.  · Limit alcohol intake to no more than 1 drink a day for nonpregnant women and 2 drinks a day for men. One drink equals 12 oz of beer, 5 oz of wine, or 1½ oz of hard liquor.  General instructions   · Keep a weight loss journal to keep track of the food you eat and how much you exercise you get.  · Take over-the-counter and prescription medicines only as told by your health care provider.  · Take vitamins and supplements only as told by your health care provider.  · Consider joining a support group. Your health care provider may be able to recommend a support group.  · Keep all follow-up visits as told by your health care provider. This is important.  Contact a health care provider if:  · You are unable to meet your weight loss goal after 6 weeks of dietary and lifestyle changes.  This information is not intended to replace advice given to you by your health care provider. Make sure you discuss any questions you have with your health care provider.  Document Released: 01/25/2006 Document Revised:  05/22/2017 Document Reviewed: 10/05/2016  [employer] Interactive Patient Education © 2017 [employer]  MyPlate from [employer]  The general, healthful diet is based on the 2010 Dietary Guidelines for Americans. The amount of food you need to eat from each food group depends on your age, sex, and level of physical activity and can be individualized by a dietitian. Go to ChooseMyPlate.gov for more information.  What do I need to know about the MyPlate plan?  · Enjoy your food, but eat less.  · Avoid oversized portions.  ¨ ½ of your plate should include fruits and vegetables.  ¨ ¼ of your plate should be grains.  ¨ ¼ of your plate should be protein.  Grains   · Make at least half of your grains whole grains.  · For a 2,000 calorie daily food plan, eat 6 oz every day.  · 1 oz is about 1 slice bread, 1 cup cereal, or ½ cup cooked rice, cereal, or pasta.  Vegetables   · Make half your plate fruits and vegetables.  · For a 2,000 calorie daily food plan, eat 2½ cups every day.  · 1 cup is about 1 cup raw or cooked vegetables or vegetable juice or 2 cups raw leafy greens.  Fruits   · Make half your plate fruits and vegetables.  · For a 2,000 calorie daily food plan, eat 2 cups every day.  · 1 cup is about 1 cup fruit or 100% fruit juice or ½ cup dried fruit.  Protein   · For a 2,000 calorie daily food plan, eat 5½ oz every day.  · 1 oz is about 1 oz meat, poultry, or fish, ¼ cup cooked beans, 1 egg, 1 Tbsp peanut butter, or ½ oz nuts or seeds.  Dairy   · Switch to fat-free or low-fat (1%) milk.  · For a 2,000 calorie daily food plan, eat 3 cups every day.  · 1 cup is about 1 cup milk or yogurt or soy milk (soy beverage), 1½ oz natural cheese, or 2 oz processed cheese.  Fats, Oils, and Empty Calories   · Only small amounts of oils are recommended.  · Empty calories are calories from solid fats or added sugars.  · Compare sodium in foods like soup, bread, and frozen meals. Choose the foods with lower numbers.  · Drink water instead  of sugary drinks.  What foods can I eat?  Grains   Whole grains such as whole wheat, quinoa, millet, and bulgur. Bread, rolls, and pasta made from whole grains. Brown or wild rice. Hot or cold cereals made from whole grains and without added sugar.  Vegetables   All fresh vegetables, especially fresh red, dark green, or orange vegetables. Peas and beans. Low-sodium frozen or canned vegetables prepared without added salt. Low-sodium vegetable juices.  Fruits   All fresh, frozen, and dried fruits. Canned fruit packed in water or fruit juice without added sugar. Fruit juices without added sugar.  Meats and Other Protein Sources   Boiled, baked, or grilled lean meat trimmed of fat. Skinless poultry. Fresh seafood and shellfish. Canned seafood packed in water. Unsalted nuts and unsalted nut butters. Tofu. Dried beans and pea. Eggs.  Dairy   Low-fat or fat-free milk, yogurt, and cheeses.  Sweets and Desserts   Frozen desserts made from low-fat milk.  Fats and Oils   Olive, peanut, and canola oils and margarine. Salad dressing and mayonnaise made from these oils.  Other   Soups and casseroles made from allowed ingredients and without added fat or salt.  The items listed above may not be a complete list of recommended foods or beverages. Contact your dietitian for more options.   What foods are not recommended?  Grains   Sweetened, low-fiber cereals. Packaged baked goods. Snack crackers and chips. Cheese crackers, butter crackers, and biscuits. Frozen waffles, sweet breads, doughnuts, pastries, packaged baking mixes, pancakes, cakes, and cookies.  Vegetables   Regular canned or frozen vegetables or vegetables prepared with salt. Canned tomatoes. Canned tomato sauce. Fried vegetables. Vegetables in cream sauce or cheese sauce.  Fruits   Fruits packed in syrup or made with added sugar.  Meats and Other Protein Sources   Marbled or fatty meats such as ribs. Poultry with skin. Fried meats, poultry, eggs, or fish. Sausages, hot  dogs, and deli meats such as pastrami, bologna, or salami.  Dairy   Whole milk, cream, cheeses made from whole milk, sour cream. Ice cream or yogurt made from whole milk or with added sugar.  Beverages   For adults, no more than one alcoholic drink per day. Regular soft drinks or other sugary beverages. Juice drinks.  Sweets and Desserts   Sugary or fatty desserts, candy, and other sweets.  Fats and Oils   Solid shortening or partially hydrogenated oils. Solid margarine. Margarine that contains trans fats. Butter.  The items listed above may not be a complete list of foods and beverages to avoid. Contact your dietitian for more information.   This information is not intended to replace advice given to you by your health care provider. Make sure you discuss any questions you have with your health care provider.  Document Released: 01/06/2009 Document Revised: 05/25/2017 Document Reviewed: 11/26/2014  [employer] Interactive Patient Education © 2017 [employer]    Edema  Edema is an abnormal buildup of fluids in your body tissues. Edema is somewhat dependent on gravity to pull the fluid to the lowest place in your body. That makes the condition more common in the legs and thighs (lower extremities). Painless swelling of the feet and ankles is common and becomes more likely as you get older. It is also common in looser tissues, like around your eyes.  When the affected area is squeezed, the fluid may move out of that spot and leave a dent for a few moments. This dent is called pitting.  What are the causes?  There are many possible causes of edema. Eating too much salt and being on your feet or sitting for a long time can cause edema in your legs and ankles. Hot weather may make edema worse. Common medical causes of edema include:  · Heart failure.  · Liver disease.  · Kidney disease.  · Weak blood vessels in your legs.  · Cancer.  · An injury.  · Pregnancy.  · Some medications.  · Obesity.  What are the signs or  symptoms?  Edema is usually painless. Your skin may look swollen or shiny.  How is this diagnosed?  Your health care provider may be able to diagnose edema by asking about your medical history and doing a physical exam. You may need to have tests such as X-rays, an electrocardiogram, or blood tests to check for medical conditions that may cause edema.  How is this treated?  Edema treatment depends on the cause. If you have heart, liver, or kidney disease, you need the treatment appropriate for these conditions. General treatment may include:  · Elevation of the affected body part above the level of your heart.  · Compression of the affected body part. Pressure from elastic bandages or support stockings squeezes the tissues and forces fluid back into the blood vessels. This keeps fluid from entering the tissues.  · Restriction of fluid and salt intake.  · Use of a water pill (diuretic). These medications are appropriate only for some types of edema. They pull fluid out of your body and make you urinate more often. This gets rid of fluid and reduces swelling, but diuretics can have side effects. Only use diuretics as directed by your health care provider.  Follow these instructions at home:  · Keep the affected body part above the level of your heart when you are lying down.  · Do not sit still or stand for prolonged periods.  · Do not put anything directly under your knees when lying down.  · Do not wear constricting clothing or garters on your upper legs.  · Exercise your legs to work the fluid back into your blood vessels. This may help the swelling go down.  · Wear elastic bandages or support stockings to reduce ankle swelling as directed by your health care provider.  · Eat a low-salt diet to reduce fluid if your health care provider recommends it.  · Only take medicines as directed by your health care provider.  Contact a health care provider if:  · Your edema is not responding to treatment.  · You have heart,  liver, or kidney disease and notice symptoms of edema.  · You have edema in your legs that does not improve after elevating them.  · You have sudden and unexplained weight gain.  Get help right away if:  · You develop shortness of breath or chest pain.  · You cannot breathe when you lie down.  · You develop pain, redness, or warmth in the swollen areas.  · You have heart, liver, or kidney disease and suddenly get edema.  · You have a fever and your symptoms suddenly get worse.  This information is not intended to replace advice given to you by your health care provider. Make sure you discuss any questions you have with your health care provider.  Document Released: 12/18/2006 Document Revised: 05/25/2017 Document Reviewed: 10/10/2014  [employer] Interactive Patient Education © 2017 [employer]

## 2018-07-12 ENCOUNTER — APPOINTMENT (OUTPATIENT)
Dept: WOMENS IMAGING | Facility: HOSPITAL | Age: 63
End: 2018-07-12

## 2018-07-12 PROCEDURE — 77063 BREAST TOMOSYNTHESIS BI: CPT | Performed by: RADIOLOGY

## 2018-07-12 PROCEDURE — 77067 SCR MAMMO BI INCL CAD: CPT | Performed by: RADIOLOGY

## 2018-07-18 ENCOUNTER — TELEPHONE (OUTPATIENT)
Dept: CARDIOLOGY | Facility: CLINIC | Age: 63
End: 2018-07-18

## 2018-07-18 DIAGNOSIS — R60.9 PERIPHERAL EDEMA: Primary | ICD-10-CM

## 2018-07-18 RX ORDER — METOLAZONE 2.5 MG/1
2.5 TABLET ORAL DAILY
Qty: 3 TABLET | Refills: 0 | Status: SHIPPED | OUTPATIENT
Start: 2018-07-18 | End: 2018-09-05

## 2018-09-05 ENCOUNTER — OFFICE VISIT (OUTPATIENT)
Dept: CARDIOLOGY | Facility: CLINIC | Age: 63
End: 2018-09-05

## 2018-09-05 VITALS
BODY MASS INDEX: 44.71 KG/M2 | RESPIRATION RATE: 16 BRPM | SYSTOLIC BLOOD PRESSURE: 122 MMHG | HEIGHT: 61 IN | HEART RATE: 83 BPM | WEIGHT: 236.8 LBS | DIASTOLIC BLOOD PRESSURE: 69 MMHG | OXYGEN SATURATION: 98 %

## 2018-09-05 DIAGNOSIS — G47.33 OSA ON CPAP: ICD-10-CM

## 2018-09-05 DIAGNOSIS — R60.9 PERIPHERAL EDEMA: ICD-10-CM

## 2018-09-05 DIAGNOSIS — I48.0 PAROXYSMAL ATRIAL FIBRILLATION (HCC): Primary | ICD-10-CM

## 2018-09-05 DIAGNOSIS — Z99.89 OSA ON CPAP: ICD-10-CM

## 2018-09-05 DIAGNOSIS — I10 ESSENTIAL HYPERTENSION: ICD-10-CM

## 2018-09-05 DIAGNOSIS — E78.5 HYPERLIPIDEMIA, UNSPECIFIED HYPERLIPIDEMIA TYPE: ICD-10-CM

## 2018-09-05 PROCEDURE — 93000 ELECTROCARDIOGRAM COMPLETE: CPT | Performed by: NURSE PRACTITIONER

## 2018-09-05 PROCEDURE — 99214 OFFICE O/P EST MOD 30 MIN: CPT | Performed by: NURSE PRACTITIONER

## 2018-09-05 RX ORDER — METOLAZONE 2.5 MG/1
2.5 TABLET ORAL DAILY
Qty: 3 TABLET | Refills: 1 | Status: SHIPPED | OUTPATIENT
Start: 2018-09-05 | End: 2018-11-14

## 2018-09-05 NOTE — PROGRESS NOTES
Subjective   Christina Holbrook is a 63 y.o. female     Chief Complaint   Patient presents with   • Atrial Fibrillation   • Hypertension       HPI    Problem List:    1. Persistent Afib (CHADS 2 - Eliquis)  2. Hypertension  2.1 Echo 5/3/16 - EF 60-65%; trace MR; mild TR; PA 58  2.2 Echo 10/6/16 - EF 65%; mild TR; PA 35-45  2.3 Echo 2/22/18-mild LVH, EF greater than 65%, diastolic dysfunction 2, mild to moderate MR, mild TR, mild AR, PA 40-45  3. Pulmonary Hypertension  4. Chronic BLE Edema   4.1 BLE Arterial U/S 5/5/17 - no evidence of sig atheroma obst disease at nay level on either side.   5. H/O CHF   6. Chest Pain  6.1 Stress Test 5/3/16 - small fixed defect in mid anterolateral, apical lateral segments; extent of defect mild to mod; post stress EF 40%  6.2 Stress Test 1/4/17 - no ischemia; low risk  6.3 Stress test 2/22/18-no ischemia, low risk  7. DEEPAK - CPAP   8. Carotid Artery Disease   8.1 Carotid Artery U/S 5/5/17 - bilateral bifurcation 16-49%; less than 15% both ICA. Antegrade flow both vertebral arteries  9.  Dyslipidemia    Patient is a 63-year-old female who presents today for a follow-up with family at her side.  She denies any chest pain, pressure, presyncope, syncope, orthopnea or PND.  Patient states that her heart rate has been elevated.  The highest it has been on her monitor was 101, however, she has injured her knee and currently has a knee brace.  She has had increase in edema and weight gain as well.  She says her thyroid was over active so they increased her medication, which she says does increase her swelling.  I advised this can also be why her heart rate is up some too.  She denies any shortness of breath with activity. She denies any signs of bleeding or cerebral ischemic events.       Current Outpatient Prescriptions   Medication Sig Dispense Refill   • apixaban (ELIQUIS) 5 MG tablet tablet Take 1 tablet by mouth 2 (Two) Times a Day. 60 tablet 5   • furosemide (LASIX) 40 MG tablet  TAKE ONE TABLET BY MOUTH ONCE DAILY 30 tablet 5   • lovastatin (MEVACOR) 20 MG tablet Take 1 tablet by mouth Daily With Dinner. 30 tablet 11   • methIMAzole (TAPAZOLE) 5 MG tablet Take 5 mg by mouth Daily.     • metoprolol tartrate (LOPRESSOR) 25 MG tablet Take 1 tablet by mouth 2 (Two) Times a Day. 1 tab in am 1/2 tab in pm 60 tablet 5   • nitroglycerin (NITROSTAT) 0.4 MG SL tablet 1 under the tongue as needed for angina, may repeat q5mins for up three doses with a max of 3 tabs within 15 min. 25 tablet 3   • potassium chloride (K-DUR) 10 MEQ CR tablet TAKE ONE TABLET BY MOUTH ONCE DAILY 30 tablet 5   • metOLazone (ZAROXOLYN) 2.5 MG tablet Take 1 tablet by mouth Daily. 3 tablet 1     No current facility-administered medications for this visit.        ALLERGIES    Naproxen    Past Medical History:   Diagnosis Date   • A-fib (CMS/[facility])    • CHF (congestive heart failure) (CMS/[facility])    • Diabetes mellitus (CMS/[facility])    • Disease of thyroid gland    • DVT (deep venous thrombosis) (CMS/[facility])     hx   • Essential hypertension 12/1/2017   • Pulmonary HTN     severe       Social History     Social History   • Marital status:      Spouse name: N/A   • Number of children: N/A   • Years of education: N/A     Occupational History   • Not on file.     Social History Main Topics   • Smoking status: Former Smoker     Packs/day: 1.00     Types: Cigarettes     Quit date: 1993   • Smokeless tobacco: Never Used   • Alcohol use No   • Drug use: No   • Sexual activity: Defer     Other Topics Concern   • Not on file     Social History Narrative   • No narrative on file       Family History   Problem Relation Age of Onset   • Heart disease Father    • Heart disease Sister    • Heart disease Brother        Review of Systems   Constitutional: Negative for diaphoresis and fatigue.   HENT: Positive for rhinorrhea and sneezing.    Eyes: Negative for visual disturbance.   Respiratory: Positive for apnea (uses a CPAP ). Negative for chest  "tightness and shortness of breath.    Cardiovascular: Positive for palpitations and leg swelling (bad currently ). Negative for chest pain.   Gastrointestinal: Negative for constipation, diarrhea, nausea and vomiting.   Endocrine: Negative.    Genitourinary: Negative for difficulty urinating.   Musculoskeletal: Positive for gait problem (using a walker ) and myalgias (left knee pain ). Negative for arthralgias, back pain and neck pain.        Left knee brace    Skin: Negative.    Allergic/Immunologic: Positive for environmental allergies.   Neurological: Positive for dizziness (just once in a great while; with position change ). Negative for syncope and light-headedness.   Hematological: Does not bruise/bleed easily.   Psychiatric/Behavioral: Negative.        Objective   /69 (BP Location: Left arm, Patient Position: Sitting)   Pulse 83   Resp 16   Ht 154.9 cm (61\")   Wt 107 kg (236 lb 12.8 oz)   SpO2 98%   BMI 44.74 kg/m²   Vitals:    09/05/18 0829   BP: 122/69   BP Location: Left arm   Patient Position: Sitting   Pulse: 83   Resp: 16   SpO2: 98%   Weight: 107 kg (236 lb 12.8 oz)   Height: 154.9 cm (61\")      Lab Results (most recent)     None        Physical Exam   Constitutional: She is oriented to person, place, and time. Vital signs are normal. She appears well-developed and well-nourished. She is active and cooperative.   HENT:   Head: Normocephalic.   Eyes: Lids are normal.   Neck: Normal carotid pulses, no hepatojugular reflux and no JVD present. Carotid bruit is not present.   Cardiovascular: Normal rate.  An irregularly irregular rhythm present.   Murmur heard.   Systolic (RUSB and LUSB 1-2) murmur is present   Pulses:       Radial pulses are 2+ on the right side, and 2+ on the left side.        Dorsalis pedis pulses are 2+ on the right side, and 2+ on the left side.        Posterior tibial pulses are 2+ on the right side, and 2+ on the left side.   1+ edema BLE; compression socks on  "   Pulmonary/Chest: Effort normal and breath sounds normal.   Abdominal: Normal appearance and bowel sounds are normal.   Musculoskeletal:        Left knee: She exhibits decreased range of motion. Tenderness found.   Uses a walker, left knee brace    Neurological: She is alert and oriented to person, place, and time.   Skin: Skin is warm, dry and intact.   Psychiatric: She has a normal mood and affect. Her speech is normal and behavior is normal. Judgment and thought content normal. Cognition and memory are normal.       Procedure     ECG 12 Lead  Date/Time: 9/5/2018 8:47 AM  Performed by: YNES MUNIZ  Authorized by: YNES MUNIZ   Comparison: compared with previous ECG from 12/1/2017  Similar to previous ECG  Rhythm: atrial fibrillation  Rate: normal  BPM: 81  QRS axis: normal  Clinical impression: abnormal ECG and low voltage                 Assessment/Plan      Diagnosis Plan   1. Paroxysmal atrial fibrillation (CMS/HCC)     2. Essential hypertension     3. DEEPAK on CPAP     4. Peripheral edema  metOLazone (ZAROXOLYN) 2.5 MG tablet   5. Hyperlipidemia, unspecified hyperlipidemia type         Return in about 8 weeks (around 10/31/2018).       A. fib-patient is on Eliquis and beta blocker.  Hypertension-patient doing well.  Dyslipidemia-patient is on lovastatin and monitor by PCP.  DEEPAK-patient is compliant with CPAP.  Peripheral edema-I will give metolazone for 3 days.  She will continue her medication regimen otherwise.  She will follow-up in 8 weeks or sooner if any changes.  She will call Tuesday and let us know how her heart rate is doing we may increase her afternoon dose of metoprolol.      Patient's Body mass index is 44.74 kg/m². BMI is above normal parameters. Recommendations include: educational material.      Electronically signed by:

## 2018-09-05 NOTE — PATIENT INSTRUCTIONS
MyPlate from [employer]  The general, healthful diet is based on the 2010 Dietary Guidelines for Americans. The amount of food you need to eat from each food group depends on your age, sex, and level of physical activity and can be individualized by a dietitian. Go to ChooseMyPlate.gov for more information.  What do I need to know about the MyPlate plan?  · Enjoy your food, but eat less.  · Avoid oversized portions.  ? ½ of your plate should include fruits and vegetables.  ? ¼ of your plate should be grains.  ? ¼ of your plate should be protein.  Grains  · Make at least half of your grains whole grains.  · For a 2,000 calorie daily food plan, eat 6 oz every day.  · 1 oz is about 1 slice bread, 1 cup cereal, or ½ cup cooked rice, cereal, or pasta.  Vegetables  · Make half your plate fruits and vegetables.  · For a 2,000 calorie daily food plan, eat 2½ cups every day.  · 1 cup is about 1 cup raw or cooked vegetables or vegetable juice or 2 cups raw leafy greens.  Fruits  · Make half your plate fruits and vegetables.  · For a 2,000 calorie daily food plan, eat 2 cups every day.  · 1 cup is about 1 cup fruit or 100% fruit juice or ½ cup dried fruit.  Protein  · For a 2,000 calorie daily food plan, eat 5½ oz every day.  · 1 oz is about 1 oz meat, poultry, or fish, ¼ cup cooked beans, 1 egg, 1 Tbsp peanut butter, or ½ oz nuts or seeds.  Dairy  · Switch to fat-free or low-fat (1%) milk.  · For a 2,000 calorie daily food plan, eat 3 cups every day.  · 1 cup is about 1 cup milk or yogurt or soy milk (soy beverage), 1½ oz natural cheese, or 2 oz processed cheese.  Fats, Oils, and Empty Calories  · Only small amounts of oils are recommended.  · Empty calories are calories from solid fats or added sugars.  · Compare sodium in foods like soup, bread, and frozen meals. Choose the foods with lower numbers.  · Drink water instead of sugary drinks.  What foods can I eat?  Grains  Whole grains such as whole wheat, quinoa, millet, and  bulgur. Bread, rolls, and pasta made from whole grains. Brown or wild rice. Hot or cold cereals made from whole grains and without added sugar.  Vegetables  All fresh vegetables, especially fresh red, dark green, or orange vegetables. Peas and beans. Low-sodium frozen or canned vegetables prepared without added salt. Low-sodium vegetable juices.  Fruits  All fresh, frozen, and dried fruits. Canned fruit packed in water or fruit juice without added sugar. Fruit juices without added sugar.  Meats and Other Protein Sources  Boiled, baked, or grilled lean meat trimmed of fat. Skinless poultry. Fresh seafood and shellfish. Canned seafood packed in water. Unsalted nuts and unsalted nut butters. Tofu. Dried beans and pea. Eggs.  Dairy  Low-fat or fat-free milk, yogurt, and cheeses.  Sweets and Desserts  Frozen desserts made from low-fat milk.  Fats and Oils  Olive, peanut, and canola oils and margarine. Salad dressing and mayonnaise made from these oils.  Other  Soups and casseroles made from allowed ingredients and without added fat or salt.  The items listed above may not be a complete list of recommended foods or beverages. Contact your dietitian for more options.  What foods are not recommended?  Grains  Sweetened, low-fiber cereals. Packaged baked goods. Snack crackers and chips. Cheese crackers, butter crackers, and biscuits. Frozen waffles, sweet breads, doughnuts, pastries, packaged baking mixes, pancakes, cakes, and cookies.  Vegetables  Regular canned or frozen vegetables or vegetables prepared with salt. Canned tomatoes. Canned tomato sauce. Fried vegetables. Vegetables in cream sauce or cheese sauce.  Fruits  Fruits packed in syrup or made with added sugar.  Meats and Other Protein Sources  Marbled or fatty meats such as ribs. Poultry with skin. Fried meats, poultry, eggs, or fish. Sausages, hot dogs, and deli meats such as pastrami, bologna, or salami.  Dairy  Whole milk, cream, cheeses made from whole milk,  sour cream. Ice cream or yogurt made from whole milk or with added sugar.  Beverages  For adults, no more than one alcoholic drink per day. Regular soft drinks or other sugary beverages. Juice drinks.  Sweets and Desserts  Sugary or fatty desserts, candy, and other sweets.  Fats and Oils  Solid shortening or partially hydrogenated oils. Solid margarine. Margarine that contains trans fats. Butter.  The items listed above may not be a complete list of foods and beverages to avoid. Contact your dietitian for more information.  This information is not intended to replace advice given to you by your health care provider. Make sure you discuss any questions you have with your health care provider.  Document Released: 01/06/2009 Document Revised: 05/25/2017 Document Reviewed: 11/26/2014  [employer] Interactive Patient Education © 2018 [employer]  Edema  Edema is an abnormal buildup of fluids in your body tissues. Edema is somewhat dependent on gravity to pull the fluid to the lowest place in your body. That makes the condition more common in the legs and thighs (lower extremities). Painless swelling of the feet and ankles is common and becomes more likely as you get older. It is also common in looser tissues, like around your eyes.  When the affected area is squeezed, the fluid may move out of that spot and leave a dent for a few moments. This dent is called pitting.  What are the causes?  There are many possible causes of edema. Eating too much salt and being on your feet or sitting for a long time can cause edema in your legs and ankles. Hot weather may make edema worse. Common medical causes of edema include:  · Heart failure.  · Liver disease.  · Kidney disease.  · Weak blood vessels in your legs.  · Cancer.  · An injury.  · Pregnancy.  · Some medications.  · Obesity.    What are the signs or symptoms?  Edema is usually painless. Your skin may look swollen or shiny.  How is this diagnosed?  Your health care provider  may be able to diagnose edema by asking about your medical history and doing a physical exam. You may need to have tests such as X-rays, an electrocardiogram, or blood tests to check for medical conditions that may cause edema.  How is this treated?  Edema treatment depends on the cause. If you have heart, liver, or kidney disease, you need the treatment appropriate for these conditions. General treatment may include:  · Elevation of the affected body part above the level of your heart.  · Compression of the affected body part. Pressure from elastic bandages or support stockings squeezes the tissues and forces fluid back into the blood vessels. This keeps fluid from entering the tissues.  · Restriction of fluid and salt intake.  · Use of a water pill (diuretic). These medications are appropriate only for some types of edema. They pull fluid out of your body and make you urinate more often. This gets rid of fluid and reduces swelling, but diuretics can have side effects. Only use diuretics as directed by your health care provider.    Follow these instructions at home:  · Keep the affected body part above the level of your heart when you are lying down.  · Do not sit still or stand for prolonged periods.  · Do not put anything directly under your knees when lying down.  · Do not wear constricting clothing or garters on your upper legs.  · Exercise your legs to work the fluid back into your blood vessels. This may help the swelling go down.  · Wear elastic bandages or support stockings to reduce ankle swelling as directed by your health care provider.  · Eat a low-salt diet to reduce fluid if your health care provider recommends it.  · Only take medicines as directed by your health care provider.  Contact a health care provider if:  · Your edema is not responding to treatment.  · You have heart, liver, or kidney disease and notice symptoms of edema.  · You have edema in your legs that does not improve after elevating  them.  · You have sudden and unexplained weight gain.  Get help right away if:  · You develop shortness of breath or chest pain.  · You cannot breathe when you lie down.  · You develop pain, redness, or warmth in the swollen areas.  · You have heart, liver, or kidney disease and suddenly get edema.  · You have a fever and your symptoms suddenly get worse.  This information is not intended to replace advice given to you by your health care provider. Make sure you discuss any questions you have with your health care provider.  Document Released: 12/18/2006 Document Revised: 05/25/2017 Document Reviewed: 10/10/2014  [employer] Interactive Patient Education © 2017 [employer]

## 2018-11-14 ENCOUNTER — TELEPHONE (OUTPATIENT)
Dept: CARDIOLOGY | Facility: CLINIC | Age: 63
End: 2018-11-14

## 2018-11-14 DIAGNOSIS — I10 ESSENTIAL HYPERTENSION: ICD-10-CM

## 2018-11-14 RX ORDER — SPIRONOLACTONE 25 MG/1
25 TABLET ORAL DAILY
Qty: 30 TABLET | Refills: 11
Start: 2018-11-14 | End: 2019-03-20 | Stop reason: SDUPTHER

## 2018-11-14 RX ORDER — POTASSIUM CHLORIDE 750 MG/1
10 TABLET, FILM COATED, EXTENDED RELEASE ORAL DAILY
Qty: 30 TABLET | Refills: 5 | Status: SHIPPED | OUTPATIENT
Start: 2018-11-14 | End: 2018-12-12 | Stop reason: SDUPTHER

## 2018-11-14 NOTE — TELEPHONE ENCOUNTER
Patient called stating that the Dr. Jesus has started the patient spironolactone due to swelling. Patient states that he has started her on this medicine as of yesterday. Patient states she has been on metolazone in the past and it seemed to help her really well. I advised patient I would send Jeana IVORY a message informing her on the patient's swelling. Patient verbalized understanding.

## 2018-12-12 ENCOUNTER — OFFICE VISIT (OUTPATIENT)
Dept: CARDIOLOGY | Facility: CLINIC | Age: 63
End: 2018-12-12

## 2018-12-12 VITALS
BODY MASS INDEX: 44.69 KG/M2 | DIASTOLIC BLOOD PRESSURE: 74 MMHG | HEART RATE: 92 BPM | HEIGHT: 61 IN | SYSTOLIC BLOOD PRESSURE: 132 MMHG | OXYGEN SATURATION: 98 % | WEIGHT: 236.7 LBS

## 2018-12-12 DIAGNOSIS — I10 ESSENTIAL HYPERTENSION: ICD-10-CM

## 2018-12-12 DIAGNOSIS — I77.9 BILATERAL CAROTID ARTERY DISEASE (HCC): ICD-10-CM

## 2018-12-12 DIAGNOSIS — I48.0 PAROXYSMAL ATRIAL FIBRILLATION (HCC): Primary | ICD-10-CM

## 2018-12-12 DIAGNOSIS — Z99.89 OSA ON CPAP: ICD-10-CM

## 2018-12-12 DIAGNOSIS — G47.33 OSA ON CPAP: ICD-10-CM

## 2018-12-12 DIAGNOSIS — R60.9 PERIPHERAL EDEMA: ICD-10-CM

## 2018-12-12 DIAGNOSIS — E78.5 HYPERLIPIDEMIA, UNSPECIFIED HYPERLIPIDEMIA TYPE: ICD-10-CM

## 2018-12-12 PROCEDURE — 99213 OFFICE O/P EST LOW 20 MIN: CPT | Performed by: NURSE PRACTITIONER

## 2018-12-12 RX ORDER — METOLAZONE 2.5 MG/1
2.5 TABLET ORAL DAILY PRN
Qty: 90 TABLET | Refills: 3 | Status: SHIPPED | OUTPATIENT
Start: 2018-12-12 | End: 2019-03-20 | Stop reason: SDUPTHER

## 2018-12-12 RX ORDER — LOVASTATIN 20 MG/1
20 TABLET ORAL
Qty: 90 TABLET | Refills: 3 | Status: SHIPPED | OUTPATIENT
Start: 2018-12-12 | End: 2019-06-28 | Stop reason: SDUPTHER

## 2018-12-12 RX ORDER — FUROSEMIDE 40 MG/1
40 TABLET ORAL DAILY
Qty: 90 TABLET | Refills: 3 | Status: SHIPPED | OUTPATIENT
Start: 2018-12-12 | End: 2019-06-07 | Stop reason: SDUPTHER

## 2018-12-12 RX ORDER — POTASSIUM CHLORIDE 750 MG/1
10 TABLET, FILM COATED, EXTENDED RELEASE ORAL DAILY
Qty: 90 TABLET | Refills: 3 | Status: SHIPPED | OUTPATIENT
Start: 2018-12-12 | End: 2019-06-07 | Stop reason: SDUPTHER

## 2018-12-12 NOTE — PATIENT INSTRUCTIONS
"Fat and Cholesterol Restricted Diet  Getting too much fat and cholesterol in your diet may cause health problems. Following this diet helps keep your fat and cholesterol at normal levels. This can keep you from getting sick.  What types of fat should I choose?  · Choose monosaturated and polyunsaturated fats. These are found in foods such as olive oil, canola oil, flaxseeds, walnuts, almonds, and seeds.  · Eat more omega-3 fats. Good choices include salmon, mackerel, sardines, tuna, flaxseed oil, and ground flaxseeds.  · Limit saturated fats. These are in animal products such as meats, butter, and cream. They can also be in plant products such as palm oil, palm kernel oil, and coconut oil.  · Avoid foods with partially hydrogenated oils in them. These contain trans fats. Examples of foods that have trans fats are stick margarine, some tub margarines, cookies, crackers, and other baked goods.  What general guidelines do I need to follow?  · Check food labels. Look for the words \"trans fat\" and \"saturated fat.\"  · When preparing a meal:  ? Fill half of your plate with vegetables and green salads.  ? Fill one fourth of your plate with whole grains. Look for the word \"whole\" as the first word in the ingredient list.  ? Fill one fourth of your plate with lean protein foods.  · Eat more foods that have fiber, like apples, carrots, beans, peas, and barley.  · Eat more home-cooked foods. Eat less at restaurants and buffets.  · Limit or avoid alcohol.  · Limit foods high in starch and sugar.  · Limit fried foods.  · Cook foods without frying them. Baking, boiling, grilling, and broiling are all great options.  · Lose weight if you are overweight. Losing even a small amount of weight can help your overall health. It can also help prevent diseases such as diabetes and heart disease.  What foods can I eat?  Grains  Whole grains, such as whole wheat or whole grain breads, crackers, cereals, and pasta. Unsweetened oatmeal, " bulgur, barley, quinoa, or brown rice. Corn or whole wheat flour tortillas.  Vegetables  Fresh or frozen vegetables (raw, steamed, roasted, or grilled). Green salads.  Fruits  All fresh, canned (in natural juice), or frozen fruits.  Meat and Other Protein Products  Ground beef (85% or leaner), grass-fed beef, or beef trimmed of fat. Skinless chicken or turkey. Ground chicken or turkey. Pork trimmed of fat. All fish and seafood. Eggs. Dried beans, peas, or lentils. Unsalted nuts or seeds. Unsalted canned or dry beans.  Dairy  Low-fat dairy products, such as skim or 1% milk, 2% or reduced-fat cheeses, low-fat ricotta or cottage cheese, or plain low-fat yogurt.  Fats and Oils  Tub margarines without trans fats. Light or reduced-fat mayonnaise and salad dressings. Avocado. Olive, canola, sesame, or safflower oils. Natural peanut or almond butter (choose ones without added sugar and oil).  The items listed above may not be a complete list of recommended foods or beverages. Contact your dietitian for more options.  What foods are not recommended?  Grains  White bread. White pasta. White rice. Cornbread. Bagels, pastries, and croissants. Crackers that contain trans fat.  Vegetables  White potatoes. Corn. Creamed or fried vegetables. Vegetables in a cheese sauce.  Fruits  Dried fruits. Canned fruit in light or heavy syrup. Fruit juice.  Meat and Other Protein Products  Fatty cuts of meat. Ribs, chicken wings, maldonado, sausage, bologna, salami, chitterlings, fatback, hot dogs, bratwurst, and packaged luncheon meats. Liver and organ meats.  Dairy  Whole or 2% milk, cream, half-and-half, and cream cheese. Whole milk cheeses. Whole-fat or sweetened yogurt. Full-fat cheeses. Nondairy creamers and whipped toppings. Processed cheese, cheese spreads, or cheese curds.  Sweets and Desserts  Corn syrup, sugars, honey, and molasses. Candy. Jam and jelly. Syrup. Sweetened cereals. Cookies, pies, cakes, donuts, muffins, and ice  cream.  Fats and Oils  Butter, stick margarine, lard, shortening, ghee, or maldonado fat. Coconut, palm kernel, or palm oils.  Beverages  Alcohol. Sweetened drinks (such as sodas, lemonade, and fruit drinks or punches).  The items listed above may not be a complete list of foods and beverages to avoid. Contact your dietitian for more information.  This information is not intended to replace advice given to you by your health care provider. Make sure you discuss any questions you have with your health care provider.  Document Released: 06/18/2013 Document Revised: 08/24/2017 Document Reviewed: 03/19/2015  [employer] Interactive Patient Education © 2018 [employer]  BMI for Adults  Body mass index (BMI) is a number that is calculated from a person's weight and height. In most adults, the number is used to find how much of an adult's weight is made up of fat. BMI is not as accurate as a direct measure of body fat.  How is BMI calculated?  BMI is calculated by dividing weight in kilograms by height in meters squared. It can also be calculated by dividing weight in pounds by height in inches squared, then multiplying the resulting number by 703. Charts are available to help you find your BMI quickly and easily without doing this calculation.  How is BMI interpreted?  Health care professionals use BMI charts to identify whether an adult is underweight, at a normal weight, or overweight based on the following guidelines:  · Underweight: BMI less than 18.5.  · Normal weight: BMI between 18.5 and 24.9.  · Overweight: BMI between 25 and 29.9.  · Obese: BMI of 30 and above.    BMI is usually interpreted the same for males and females.  Weight includes both fat and muscle, so someone with a muscular build, such as an athlete, may have a BMI that is higher than 24.9. In cases like these, BMI may not accurately depict body fat. To determine if excess body fat is the cause of a BMI of 25 or higher, further assessments may need to be  done by a health care provider.  Why is BMI a useful tool?  BMI is used to identify a possible weight problem that may be related to a medical problem or may increase the risk for medical problems. BMI can also be used to promote changes to reach a healthy weight.  This information is not intended to replace advice given to you by your health care provider. Make sure you discuss any questions you have with your health care provider.  Document Released: 08/29/2005 Document Revised: 04/27/2017 Document Reviewed: 05/15/2015  [employer] Interactive Patient Education © 2018 [employer]    Edema  Edema is an abnormal buildup of fluids in your body tissues. Edema is somewhat dependent on gravity to pull the fluid to the lowest place in your body. That makes the condition more common in the legs and thighs (lower extremities). Painless swelling of the feet and ankles is common and becomes more likely as you get older. It is also common in looser tissues, like around your eyes.  When the affected area is squeezed, the fluid may move out of that spot and leave a dent for a few moments. This dent is called pitting.  What are the causes?  There are many possible causes of edema. Eating too much salt and being on your feet or sitting for a long time can cause edema in your legs and ankles. Hot weather may make edema worse. Common medical causes of edema include:  · Heart failure.  · Liver disease.  · Kidney disease.  · Weak blood vessels in your legs.  · Cancer.  · An injury.  · Pregnancy.  · Some medications.  · Obesity.    What are the signs or symptoms?  Edema is usually painless. Your skin may look swollen or shiny.  How is this diagnosed?  Your health care provider may be able to diagnose edema by asking about your medical history and doing a physical exam. You may need to have tests such as X-rays, an electrocardiogram, or blood tests to check for medical conditions that may cause edema.  How is this treated?  Edema  treatment depends on the cause. If you have heart, liver, or kidney disease, you need the treatment appropriate for these conditions. General treatment may include:  · Elevation of the affected body part above the level of your heart.  · Compression of the affected body part. Pressure from elastic bandages or support stockings squeezes the tissues and forces fluid back into the blood vessels. This keeps fluid from entering the tissues.  · Restriction of fluid and salt intake.  · Use of a water pill (diuretic). These medications are appropriate only for some types of edema. They pull fluid out of your body and make you urinate more often. This gets rid of fluid and reduces swelling, but diuretics can have side effects. Only use diuretics as directed by your health care provider.    Follow these instructions at home:  · Keep the affected body part above the level of your heart when you are lying down.  · Do not sit still or stand for prolonged periods.  · Do not put anything directly under your knees when lying down.  · Do not wear constricting clothing or garters on your upper legs.  · Exercise your legs to work the fluid back into your blood vessels. This may help the swelling go down.  · Wear elastic bandages or support stockings to reduce ankle swelling as directed by your health care provider.  · Eat a low-salt diet to reduce fluid if your health care provider recommends it.  · Only take medicines as directed by your health care provider.  Contact a health care provider if:  · Your edema is not responding to treatment.  · You have heart, liver, or kidney disease and notice symptoms of edema.  · You have edema in your legs that does not improve after elevating them.  · You have sudden and unexplained weight gain.  Get help right away if:  · You develop shortness of breath or chest pain.  · You cannot breathe when you lie down.  · You develop pain, redness, or warmth in the swollen areas.  · You have heart, liver,  or kidney disease and suddenly get edema.  · You have a fever and your symptoms suddenly get worse.  This information is not intended to replace advice given to you by your health care provider. Make sure you discuss any questions you have with your health care provider.  Document Released: 12/18/2006 Document Revised: 05/25/2017 Document Reviewed: 10/10/2014  [employer] Interactive Patient Education © 2017 [employer]

## 2018-12-12 NOTE — PROGRESS NOTES
Subjective   Christina Holbrook is a 63 y.o. female     Chief Complaint   Patient presents with   • Follow-up     Pt in office for 8 wk follow up appt   • Atrial Fibrillation   • Shortness of Breath   • Congestive Heart Failure       HPI    Problem List:    1. Persistent Afib (CHADS 2 - Eliquis)  2. Hypertension  2.1 Echo 5/3/16 - EF 60-65%; trace MR; mild TR; PA 58  2.2 Echo 10/6/16 - EF 65%; mild TR; PA 35-45  2.3 Echo 2/22/18-mild LVH, EF greater than 65%, diastolic dysfunction 2, mild to moderate MR, mild TR, mild AR, PA 40-45  3. Pulmonary Hypertension  4. Chronic BLE Edema   4.1 BLE Arterial U/S 5/5/17 - no evidence of sig atheroma obst disease at nay level on either side.   5. H/O CHF   6. Chest Pain  6.1 Stress Test 5/3/16 - small fixed defect in mid anterolateral, apical lateral segments; extent of defect mild to mod; post stress EF 40%  6.2 Stress Test 1/4/17 - no ischemia; low risk  6.3 Stress test 2/22/18-no ischemia, low risk  7. DEEPAK - CPAP   8. Carotid Artery Disease   8.1 Carotid Artery U/S 5/5/17 - bilateral bifurcation 16-49%; less than 15% both ICA. Antegrade flow both vertebral arteries  9.  Dyslipidemia    Patient is a 63-year-old female who presents today for follow-up.  She denies any chest pain, pressure, palpitations, fluttering, dizziness, presyncope, syncope, orthopnea or PND.  She says her PCP even add Aldactone and she still has some swelling but has history of blood clots in her legs.  She says it always told since then.  She does have bilateral compression socks on.  She denies any shortness of breath with activity.  She denies any signs of bleeding or cerebral ischemic events.    Current Outpatient Medications   Medication Sig Dispense Refill   • apixaban (ELIQUIS) 5 MG tablet tablet Take 1 tablet by mouth Every 12 (Twelve) Hours. 180 tablet 3   • furosemide (LASIX) 40 MG tablet Take 1 tablet by mouth Daily. 90 tablet 3   • lovastatin (MEVACOR) 20 MG tablet Take 1 tablet by mouth  Daily With Dinner. 90 tablet 3   • methIMAzole (TAPAZOLE) 5 MG tablet Take 5 mg by mouth Daily.     • metoprolol tartrate (LOPRESSOR) 25 MG tablet 1 tab by mouth in am and 1/2 tab by mouth in pm 180 tablet 3   • nitroglycerin (NITROSTAT) 0.4 MG SL tablet 1 under the tongue as needed for angina, may repeat q5mins for up three doses with a max of 3 tabs within 15 min. 25 tablet 3   • potassium chloride (K-DUR) 10 MEQ CR tablet Take 1 tablet by mouth Daily. 90 tablet 3   • spironolactone (ALDACTONE) 25 MG tablet Take 1 tablet by mouth Daily. 30 tablet 11   • metOLazone (ZAROXOLYN) 2.5 MG tablet Take 1 tablet by mouth Daily As Needed (30 min before lasix for increased edema and/or 2 lb weight gain overnight). 90 tablet 3     No current facility-administered medications for this visit.        ALLERGIES    Naproxen    Past Medical History:   Diagnosis Date   • A-fib (CMS/[facility])    • CHF (congestive heart failure) (CMS/[facility])    • Diabetes mellitus (CMS/[facility])    • Disease of thyroid gland    • DVT (deep venous thrombosis) (CMS/[facility])     hx   • Essential hypertension 2017   • Pulmonary HTN (CMS/[facility])     severe       Social History     Socioeconomic History   • Marital status:      Spouse name: Not on file   • Number of children: Not on file   • Years of education: Not on file   • Highest education level: Not on file   Social Needs   • Financial resource strain: Not on file   • Food insecurity - worry: Not on file   • Food insecurity - inability: Not on file   • Transportation needs - medical: Not on file   • Transportation needs - non-medical: Not on file   Occupational History   • Not on file   Tobacco Use   • Smoking status: Former Smoker     Packs/day: 1.00     Types: Cigarettes     Last attempt to quit:      Years since quittin.9   • Smokeless tobacco: Never Used   Substance and Sexual Activity   • Alcohol use: No   • Drug use: No   • Sexual activity: Defer   Other Topics Concern   • Not on file   Social  "History Narrative   • Not on file       Family History   Problem Relation Age of Onset   • Heart disease Father    • Heart disease Sister    • Heart disease Brother        Review of Systems   Constitutional: Negative for fatigue.   HENT: Positive for rhinorrhea. Negative for congestion and sore throat.    Eyes: Negative for visual disturbance.   Respiratory: Negative for chest tightness and shortness of breath.    Cardiovascular: Positive for leg swelling (bilateral edema). Negative for chest pain and palpitations.   Gastrointestinal: Negative for abdominal pain, blood in stool, constipation, diarrhea, nausea and vomiting.   Endocrine: Negative for cold intolerance and heat intolerance.   Genitourinary: Negative for difficulty urinating, dysuria, frequency, hematuria and urgency.   Musculoskeletal: Negative for arthralgias, back pain and neck pain.   Skin: Negative for rash and wound.   Allergic/Immunologic: Positive for environmental allergies (seasonal ). Negative for food allergies.   Neurological: Positive for headaches (daily ). Negative for dizziness, syncope, weakness, light-headedness and numbness.   Hematological: Does not bruise/bleed easily.   Psychiatric/Behavioral: Negative for sleep disturbance.       Objective   /74   Pulse 92   Ht 154.9 cm (61\")   Wt 107 kg (236 lb 11.2 oz)   SpO2 98%   BMI 44.72 kg/m²   Vitals:    12/12/18 0843   BP: 132/74   Pulse: 92   SpO2: 98%   Weight: 107 kg (236 lb 11.2 oz)   Height: 154.9 cm (61\")      Lab Results (most recent)     None        Physical Exam   Constitutional: She is oriented to person, place, and time. Vital signs are normal. She appears well-developed and well-nourished. She is active and cooperative.   HENT:   Head: Normocephalic.   Eyes: Lids are normal.   Neck: Normal carotid pulses, no hepatojugular reflux and no JVD present. Carotid bruit is not present.   Cardiovascular: Normal rate. An irregularly irregular rhythm present.   Murmur heard.   " Systolic (RUSB and LUSB 1-2) murmur is present.  Pulses:       Radial pulses are 2+ on the right side, and 2+ on the left side.        Dorsalis pedis pulses are 2+ on the right side, and 2+ on the left side.        Posterior tibial pulses are 2+ on the right side, and 2+ on the left side.   Trace edema BLE; Compression socks on   Pulmonary/Chest: Effort normal and breath sounds normal.   Abdominal: Normal appearance and bowel sounds are normal.   Neurological: She is alert and oriented to person, place, and time.   Skin: Skin is warm, dry and intact.   Psychiatric: She has a normal mood and affect. Her speech is normal and behavior is normal. Judgment and thought content normal. Cognition and memory are normal.       Procedure   Procedures         Assessment/Plan      Diagnosis Plan   1. Paroxysmal atrial fibrillation (CMS/HCC)  apixaban (ELIQUIS) 5 MG tablet tablet   2. Essential hypertension  metoprolol tartrate (LOPRESSOR) 25 MG tablet    potassium chloride (K-DUR) 10 MEQ CR tablet   3. Hyperlipidemia, unspecified hyperlipidemia type     4. DEEPAK on CPAP     5. Peripheral edema  furosemide (LASIX) 40 MG tablet   6. Bilateral carotid artery disease (CMS/HCC)  lovastatin (MEVACOR) 20 MG tablet       Return in about 6 months (around 6/12/2019).    Paroxysmal A. fib-patient is on beta and Eliquis.  Hypertension-patient doing very well.  Hyperlipidemia-patient is on lovastatin and monitor by PCP.  DEEPAK-patient is compliant with CPAP.  Peripheral edema-patient is on Lasix and Aldactone and has metolazone to use as needed.  Bilateral carotid artery disease-patient is on Eliquis and statin.  She'll continue her medication regimen.  She'll follow-up in 6 months or sooner if any changes.         Patient's Body mass index is 44.72 kg/m². BMI is above normal parameters. Recommendations include: educational material.      Electronically signed by:

## 2018-12-29 DIAGNOSIS — R07.2 PRECORDIAL PAIN: ICD-10-CM

## 2018-12-31 RX ORDER — NITROGLYCERIN 0.4 MG/1
TABLET SUBLINGUAL
Qty: 25 TABLET | Refills: 3 | Status: SHIPPED | OUTPATIENT
Start: 2018-12-31 | End: 2019-04-08 | Stop reason: SDUPTHER

## 2019-03-20 RX ORDER — METOLAZONE 2.5 MG/1
2.5 TABLET ORAL DAILY PRN
Qty: 90 TABLET | Refills: 3 | Status: SHIPPED | OUTPATIENT
Start: 2019-03-20 | End: 2019-03-21 | Stop reason: ALTCHOICE

## 2019-03-20 RX ORDER — SPIRONOLACTONE 25 MG/1
25 TABLET ORAL DAILY
Qty: 30 TABLET | Refills: 11
Start: 2019-03-20 | End: 2019-04-09 | Stop reason: SDUPTHER

## 2019-03-21 ENCOUNTER — TELEPHONE (OUTPATIENT)
Dept: CARDIOLOGY | Facility: CLINIC | Age: 64
End: 2019-03-21

## 2019-03-21 RX ORDER — FUROSEMIDE 20 MG/1
TABLET ORAL
Qty: 30 TABLET | Refills: 6 | Status: SHIPPED | OUTPATIENT
Start: 2019-03-21 | End: 2019-07-11 | Stop reason: SDUPTHER

## 2019-03-21 NOTE — TELEPHONE ENCOUNTER
"Received fax from [facility] [employer] stating that they won't cover Metolazone for pt. (sent to scan)   Per Jeana, have pt take an additional 20mg Lasix if she gets increased edema over night.           First attempt to reach pt. Recording stated \"Please enter your remote access code...\" No option to leave a message.   "

## 2019-04-08 DIAGNOSIS — R07.2 PRECORDIAL PAIN: ICD-10-CM

## 2019-04-08 RX ORDER — NITROGLYCERIN 0.4 MG/1
TABLET SUBLINGUAL
Qty: 25 TABLET | Refills: 3 | Status: SHIPPED | OUTPATIENT
Start: 2019-04-08 | End: 2019-06-07 | Stop reason: SDUPTHER

## 2019-04-09 RX ORDER — SPIRONOLACTONE 25 MG/1
25 TABLET ORAL DAILY
Qty: 30 TABLET | Refills: 11
Start: 2019-04-09 | End: 2019-09-04 | Stop reason: SDUPTHER

## 2019-06-07 ENCOUNTER — OFFICE VISIT (OUTPATIENT)
Dept: CARDIOLOGY | Facility: CLINIC | Age: 64
End: 2019-06-07

## 2019-06-07 VITALS
HEIGHT: 61 IN | OXYGEN SATURATION: 90 % | SYSTOLIC BLOOD PRESSURE: 138 MMHG | DIASTOLIC BLOOD PRESSURE: 64 MMHG | BODY MASS INDEX: 43.05 KG/M2 | HEART RATE: 95 BPM | WEIGHT: 228 LBS

## 2019-06-07 DIAGNOSIS — I10 ESSENTIAL HYPERTENSION: ICD-10-CM

## 2019-06-07 DIAGNOSIS — R60.9 PERIPHERAL EDEMA: ICD-10-CM

## 2019-06-07 DIAGNOSIS — R06.02 SHORTNESS OF BREATH: ICD-10-CM

## 2019-06-07 DIAGNOSIS — I48.0 PAROXYSMAL ATRIAL FIBRILLATION (HCC): ICD-10-CM

## 2019-06-07 DIAGNOSIS — L03.115 CELLULITIS OF RIGHT LOWER EXTREMITY: ICD-10-CM

## 2019-06-07 DIAGNOSIS — G47.33 OSA ON CPAP: ICD-10-CM

## 2019-06-07 DIAGNOSIS — E78.5 HYPERLIPIDEMIA, UNSPECIFIED HYPERLIPIDEMIA TYPE: ICD-10-CM

## 2019-06-07 DIAGNOSIS — Z99.89 OSA ON CPAP: ICD-10-CM

## 2019-06-07 DIAGNOSIS — R07.89 OTHER CHEST PAIN: Primary | ICD-10-CM

## 2019-06-07 DIAGNOSIS — R07.2 PRECORDIAL PAIN: ICD-10-CM

## 2019-06-07 DIAGNOSIS — Z00.00 HEALTHCARE MAINTENANCE: ICD-10-CM

## 2019-06-07 PROCEDURE — 93000 ELECTROCARDIOGRAM COMPLETE: CPT | Performed by: NURSE PRACTITIONER

## 2019-06-07 PROCEDURE — 99214 OFFICE O/P EST MOD 30 MIN: CPT | Performed by: NURSE PRACTITIONER

## 2019-06-07 RX ORDER — NITROGLYCERIN 0.4 MG/1
TABLET SUBLINGUAL
Qty: 25 TABLET | Refills: 3 | Status: SHIPPED | OUTPATIENT
Start: 2019-06-07 | End: 2020-04-09 | Stop reason: SDUPTHER

## 2019-06-07 RX ORDER — CEPHALEXIN 500 MG/1
500 CAPSULE ORAL 3 TIMES DAILY
Qty: 21 CAPSULE | Refills: 0 | Status: SHIPPED | OUTPATIENT
Start: 2019-06-07 | End: 2019-07-11

## 2019-06-07 RX ORDER — POTASSIUM CHLORIDE 750 MG/1
10 TABLET, FILM COATED, EXTENDED RELEASE ORAL DAILY
Qty: 90 TABLET | Refills: 3 | Status: SHIPPED | OUTPATIENT
Start: 2019-06-07 | End: 2020-04-09 | Stop reason: SDUPTHER

## 2019-06-07 RX ORDER — FUROSEMIDE 40 MG/1
40 TABLET ORAL DAILY
Qty: 90 TABLET | Refills: 3 | Status: SHIPPED | OUTPATIENT
Start: 2019-06-07

## 2019-06-07 NOTE — PATIENT INSTRUCTIONS
"Fat and Cholesterol Restricted Eating Plan  Getting too much fat and cholesterol in your diet may cause health problems. Choosing the right foods helps keep your fat and cholesterol at normal levels. This can keep you from getting certain diseases.  Your doctor may recommend an eating plan that includes:  · Total fat: ______% or less of total calories a day.  · Saturated fat: ______% or less of total calories a day.  · Cholesterol: less than _________mg a day.  · Fiber: ______g a day.    What are tips for following this plan?  General tips  · Work with your doctor to lose weight if you need to.  · Avoid:  ? Foods with added sugar.  ? Fried foods.  ? Foods with partially hydrogenated oils.  · Limit alcohol intake to no more than 1 drink a day for nonpregnant women and 2 drinks a day for men. One drink equals 12 oz of beer, 5 oz of wine, or 1½ oz of hard liquor.  Reading food labels  · Check food labels for:  ? Trans fats.  ? Partially hydrogenated oils.  ? Saturated fat (g) in each serving.  ? Cholesterol (mg) in each serving.  ? Fiber (g) in each serving.  · Choose foods with healthy fats, such as:  ? Monounsaturated fats.  ? Polyunsaturated fats.  ? Omega-3 fats.  · Choose grain products that have whole grains. Look for the word \"whole\" as the first word in the ingredient list.  Cooking  · Cook foods using low-fat methods. These include baking, boiling, grilling, and broiling.  · Eat more home-cooked foods. Eat at restaurants and buffets less often.  · Avoid cooking using saturated fats, such as butter, cream, palm oil, palm kernel oil, and coconut oil.  Meal planning  · At meals, divide your plate into four equal parts:  ? Fill one-half of your plate with vegetables and green salads.  ? Fill one-fourth of your plate with whole grains.  ? Fill one-fourth of your plate with low-fat (lean) protein foods.  · Eat fish that is high in omega-3 fats at least two times a week. This includes mackerel, tuna, sardines, and " salmon.  · Eat foods that are high in fiber, such as whole grains, beans, apples, broccoli, carrots, peas, and barley.  Recommended foods  Grains  · Whole grains, such as whole wheat or whole grain breads, crackers, cereals, and pasta. Unsweetened oatmeal, bulgur, barley, quinoa, or brown rice. Corn or whole wheat flour tortillas.  Vegetables  · Fresh or frozen vegetables (raw, steamed, roasted, or grilled). Green salads.  Fruits  · All fresh, canned (in natural juice), or frozen fruits.  Meats and other protein foods  · Ground beef (85% or leaner), grass-fed beef, or beef trimmed of fat. Skinless chicken or turkey. Ground chicken or turkey. Pork trimmed of fat. All fish and seafood. Egg whites. Dried beans, peas, or lentils. Unsalted nuts or seeds. Unsalted canned beans. Nut butters without added sugar or oil.  Dairy  · Low-fat or nonfat dairy products, such as skim or 1% milk, 2% or reduced-fat cheeses, low-fat and fat-free ricotta or cottage cheese, or plain low-fat and nonfat yogurt.  Fats and oils  · Tub margarine without trans fats. Light or reduced-fat mayonnaise and salad dressings. Avocado. Olive, canola, sesame, or safflower oils.  The items listed above may not be a complete list of recommended foods or beverages. Contact your dietitian for more options.  Foods to avoid  Grains  · White bread. White pasta. White rice. Cornbread. Bagels, pastries, and croissants. Crackers and snack foods that contain trans fat and hydrogenated oils.  Vegetables  · Vegetables cooked in cheese, cream, or butter sauce. Fried vegetables.  Fruits  · Canned fruit in heavy syrup. Fruit in cream or butter sauce. Fried fruit.  Meats and other protein foods  · Fatty cuts of meat. Ribs, chicken wings, maldonado, sausage, bologna, salami, chitterlings, fatback, hot dogs, bratwurst, and packaged lunch meats. Liver and organ meats. Whole eggs and egg yolks. Chicken and turkey with skin. Fried meat.  Dairy  · Whole or 2% milk, cream,  half-and-half, and cream cheese. Whole milk cheeses. Whole-fat or sweetened yogurt. Full-fat cheeses. Nondairy creamers and whipped toppings. Processed cheese, cheese spreads, and cheese curds.  Beverages  · Alcohol. Sugar-sweetened drinks such as sodas, lemonade, and fruit drinks.  Fats and oils  · Butter, stick margarine, lard, shortening, ghee, or maldonado fat. Coconut, palm kernel, and palm oils.  Sweets and desserts  · Corn syrup, sugars, honey, and molasses. Candy. Jam and jelly. Syrup. Sweetened cereals. Cookies, pies, cakes, donuts, muffins, and ice cream.  The items listed above may not be a complete list of foods and beverages to avoid. Contact your dietitian for more information.  Summary  · Choosing the right foods helps keep your fat and cholesterol at normal levels. This can keep you from getting certain diseases.  · At meals, fill one-half of your plate with vegetables and green salads.  · Eat high-fiber foods, like whole grains, beans, apples, carrots, peas, and barley.  · Limit added sugar, saturated fats, alcohol, and fried foods.  This information is not intended to replace advice given to you by your health care provider. Make sure you discuss any questions you have with your health care provider.  Document Released: 06/18/2013 Document Revised: 09/04/2018 Document Reviewed: 09/04/2018  [employer] Interactive Patient Education © 2019 [employer]  BMI for Adults  Body mass index (BMI) is a number that is calculated from a person's weight and height. In most adults, the number is used to find how much of an adult's weight is made up of fat. BMI is not as accurate as a direct measure of body fat.  How is BMI calculated?  BMI is calculated by dividing weight in kilograms by height in meters squared. It can also be calculated by dividing weight in pounds by height in inches squared, then multiplying the resulting number by 703. Charts are available to help you find your BMI quickly and easily without  doing this calculation.  How is BMI interpreted?  Health care professionals use BMI charts to identify whether an adult is underweight, at a normal weight, or overweight based on the following guidelines:  · Underweight: BMI less than 18.5.  · Normal weight: BMI between 18.5 and 24.9.  · Overweight: BMI between 25 and 29.9.  · Obese: BMI of 30 and above.    BMI is usually interpreted the same for males and females.  Weight includes both fat and muscle, so someone with a muscular build, such as an athlete, may have a BMI that is higher than 24.9. In cases like these, BMI may not accurately depict body fat. To determine if excess body fat is the cause of a BMI of 25 or higher, further assessments may need to be done by a health care provider.  Why is BMI a useful tool?  BMI is used to identify a possible weight problem that may be related to a medical problem or may increase the risk for medical problems. BMI can also be used to promote changes to reach a healthy weight.  This information is not intended to replace advice given to you by your health care provider. Make sure you discuss any questions you have with your health care provider.  Document Released: 08/29/2005 Document Revised: 04/27/2017 Document Reviewed: 05/15/2015  [employer] Interactive Patient Education © 2018 [employer]    Nonspecific Chest Pain  Chest pain can be caused by many different conditions. There is a chance that your pain could be related to something serious, such as a heart attack or a blood clot in your lungs. Chest pain can also be caused by conditions that are not life-threatening. If you have chest pain, it is very important to follow up with your doctor.  Follow these instructions at home:  Medicines  · If you were prescribed an antibiotic medicine, take it as told by your doctor. Do not stop taking the antibiotic even if you start to feel better.  · Take over-the-counter and prescription medicines only as told by your  doctor.  Lifestyle  · Do not use any products that contain nicotine or tobacco, such as cigarettes and e-cigarettes. If you need help quitting, ask your doctor.  · Do not drink alcohol.  · Make lifestyle changes as told by your doctor. These may include:  ? Getting regular exercise. Ask your doctor for some activities that are safe for you.  ? Eating a heart-healthy diet. A diet specialist (dietitian) can help you to learn healthy eating options.  ? Staying at a healthy weight.  ? Managing diabetes, if needed.  ? Lowering your stress, as with deep breathing or spending time in nature.  General instructions  · Avoid any activities that make you feel chest pain.  · If your chest pain is because of heartburn:  ? Raise (elevate) the head of your bed about 6 inches (15 cm). You can do this by putting blocks under the bed legs at the head of the bed.  ? Do not sleep with extra pillows under your head. That does not help heartburn.  · Keep all follow-up visits as told by your doctor. This is important. This includes any further testing if your chest pain does not go away.  Contact a doctor if:  · Your chest pain does not go away.  · You have a rash with blisters on your chest.  · You have a fever.  · You have chills.  Get help right away if:  · Your chest pain is worse.  · You have a cough that gets worse, or you cough up blood.  · You have very bad (severe) pain in your belly (abdomen).  · You are very weak.  · You pass out (faint).  · You have either of these for no clear reason:  ? Sudden chest discomfort.  ? Sudden discomfort in your arms, back, neck, or jaw.  · You have shortness of breath at any time.  · You suddenly start to sweat, or your skin gets clammy.  · You feel sick to your stomach (nauseous).  · You throw up (vomit).  · You suddenly feel light-headed or dizzy.  · Your heart starts to beat fast, or it feels like it is skipping beats.  These symptoms may be an emergency. Do not wait to see if the symptoms  will go away. Get medical help right away. Call your local emergency services (911 in the [place] Do not drive yourself to the hospital.  This information is not intended to replace advice given to you by your health care provider. Make sure you discuss any questions you have with your health care provider.  Document Released: 06/05/2009 Document Revised: 09/11/2017 Document Reviewed: 09/11/2017  [employer] Interactive Patient Education © 2019 [employer]    Cellulitis, Adult  Cellulitis is a skin infection. The infected area is usually red and sore. This condition occurs most often in the arms and lower legs. It is very important to get treated for this condition.  Follow these instructions at home:  · Take over-the-counter and prescription medicines only as told by your doctor.  · If you were prescribed an antibiotic medicine, take it as told by your doctor. Do not stop taking the antibiotic even if you start to feel better.  · Drink enough fluid to keep your pee (urine) pale yellow.  · Do not touch or rub the infected area.  · Raise (elevate) the infected area above the level of your heart while you are sitting or lying down.  · Place warm or cold wet cloths (warm or cold compresses) on the infected area. Do this as told by your doctor.  · Keep all follow-up visits as told by your doctor. This is important. These visits let your doctor make sure your infection is not getting worse.  Contact a doctor if:  · You have a fever.  · Your symptoms do not get better after 1-2 days of treatment.  · Your bone or joint under the infected area starts to hurt after the skin has healed.  · Your infection comes back. This can happen in the same area or another area.  · You have a swollen bump in the infected area.  · You have new symptoms.  · You feel ill and also have muscle aches and pains.  Get help right away if:  · Your symptoms get worse.  · You feel very sleepy.  · You throw up (vomit) or have watery poop (diarrhea) for a  long time.  · There are red streaks coming from the infected area.  · Your red area gets larger.  · Your red area turns darker.  This information is not intended to replace advice given to you by your health care provider. Make sure you discuss any questions you have with your health care provider.  Document Released: 06/05/2009 Document Revised: 08/06/2018 Document Reviewed: 10/26/2016  [employer] Interactive Patient Education © 2019 [employer]

## 2019-06-07 NOTE — PROGRESS NOTES
Subjective   Christina Holbrook is a 63 y.o. female     Chief Complaint   Patient presents with   • Follow-up     Here for a 6 month follow up       HPI    Problem List:    1. Persistent Afib (CHADS 2 - Eliquis)  2. Hypertension  2.1 Echo 5/3/16 - EF 60-65%; trace MR; mild TR; PA 58  2.2 Echo 10/6/16 - EF 65%; mild TR; PA 35-45  2.3 Echo 2/22/18-mild LVH, EF greater than 65%, diastolic dysfunction 2, mild to moderate MR, mild TR, mild AR, PA 40-45  3. Pulmonary Hypertension  4. Chronic BLE Edema   4.1 BLE Arterial U/S 5/5/17 - no evidence of sig atheroma obst disease at nay level on either side.   5. H/O CHF   6. Chest Pain  6.1 Stress Test 5/3/16 - small fixed defect in mid anterolateral, apical lateral segments; extent of defect mild to mod; post stress EF 40%  6.2 Stress Test 1/4/17 - no ischemia; low risk  6.3 Stress test 2/22/18-no ischemia, low risk  7. DEEPAK - CPAP   8. Carotid Artery Disease   8.1 Carotid Artery U/S 5/5/17 - bilateral bifurcation 16-49%; less than 15% both ICA. Antegrade flow both vertebral arteries  9.  Dyslipidemia    Patient is a 63-year-old female who presents today for a follow-up.  She says she has been having dull ache/pressure midsternum at times.  She says she has taken nitroglycerin up to 2-3 doses and it resolves.  She says she does get short of breath with it.  She says it is just very random when it comes.  She says she does have racing at times which is separate from her chest pain.  She denies any dizziness, presyncope, syncope, orthopnea or PND.  She says she does get some swelling in her legs but mostly resolves overnight.  She has some erythema on the right lower extremity and also an area that is hard with a pit.    Current Outpatient Medications   Medication Sig Dispense Refill   • apixaban (ELIQUIS) 5 MG tablet tablet Take 1 tablet by mouth Every 12 (Twelve) Hours. 180 tablet 3   • furosemide (LASIX) 20 MG tablet Take 1 20mg tab, PRN if increased edema over night. 30  tablet 6   • furosemide (LASIX) 40 MG tablet Take 1 tablet by mouth Daily. 90 tablet 3   • lovastatin (MEVACOR) 20 MG tablet Take 1 tablet by mouth Daily With Dinner. 90 tablet 3   • metoprolol tartrate (LOPRESSOR) 25 MG tablet Take 1 tablet by mouth 2 (Two) Times a Day. 180 tablet 3   • nitroglycerin (NITROSTAT) 0.4 MG SL tablet DISSOLVE ONE TABLET UNDER THE TONGUE EVERY 5 MINUTES AS NEEDED FOR CHEST PAIN.  DO NOT EXCEED A TOTAL OF 3 DOSES IN 15 MINUTES 25 tablet 3   • potassium chloride (K-DUR) 10 MEQ CR tablet Take 1 tablet by mouth Daily. 90 tablet 3   • spironolactone (ALDACTONE) 25 MG tablet Take 1 tablet by mouth Daily. 30 tablet 11   • cephalexin (KEFLEX) 500 MG capsule Take 1 capsule by mouth 3 (Three) Times a Day. 21 capsule 0     No current facility-administered medications for this visit.        ALLERGIES    Naproxen    Past Medical History:   Diagnosis Date   • A-fib (CMS/[facility])    • CHF (congestive heart failure) (CMS/[facility])    • Diabetes mellitus (CMS/[facility])    • Disease of thyroid gland    • DVT (deep venous thrombosis) (CMS/[facility])     hx   • Essential hypertension 2017   • Pulmonary HTN (CMS/[facility])     severe       Social History     Socioeconomic History   • Marital status:      Spouse name: Not on file   • Number of children: Not on file   • Years of education: Not on file   • Highest education level: Not on file   Tobacco Use   • Smoking status: Former Smoker     Packs/day: 1.00     Types: Cigarettes     Last attempt to quit:      Years since quittin.4   • Smokeless tobacco: Never Used   Substance and Sexual Activity   • Alcohol use: No   • Drug use: No   • Sexual activity: Defer       Family History   Problem Relation Age of Onset   • Heart disease Father    • Heart disease Sister    • Heart disease Brother        Review of Systems   Constitutional: Negative for chills, fatigue and fever.   HENT: Negative for congestion, rhinorrhea and sore throat.    Eyes: Negative for visual  "disturbance.   Respiratory: Positive for apnea (Uses Cpap nightly ), chest tightness (Squeezing pain at times ) and shortness of breath (with CP ).    Cardiovascular: Positive for chest pain (Having some dull aches/pressure midsternum at times. Has taken NTG for 2-3 doses and it resolves  ), palpitations (Races; separate ) and leg swelling (LE edema which mostly resolves overnight ).   Gastrointestinal: Negative for abdominal pain, blood in stool, nausea and vomiting.   Endocrine: Negative for cold intolerance and heat intolerance.   Genitourinary: Positive for frequency (diuretics). Negative for dysuria, hematuria and urgency.   Musculoskeletal: Negative for arthralgias and back pain.   Skin: Positive for wound (Sore on lower right leg which hasn't healed ). Negative for rash.   Allergic/Immunologic: Negative for environmental allergies and food allergies.   Neurological: Negative for dizziness, weakness and light-headedness.   Hematological: Does not bruise/bleed easily.   Psychiatric/Behavioral: Negative for sleep disturbance (Denies waking with smothering or SOA).       Objective   /64 (BP Location: Left arm, Patient Position: Sitting)   Pulse 95   Ht 154.9 cm (61\")   Wt 103 kg (228 lb)   SpO2 90%   BMI 43.08 kg/m²   Vitals:    06/07/19 0816   BP: 138/64   BP Location: Left arm   Patient Position: Sitting   Pulse: 95   SpO2: 90%   Weight: 103 kg (228 lb)   Height: 154.9 cm (61\")      Lab Results (most recent)     None        Physical Exam   Constitutional: She is oriented to person, place, and time. Vital signs are normal. She appears well-developed and well-nourished. She is active and cooperative.   HENT:   Head: Normocephalic.   Eyes: Lids are normal.   Neck: Normal carotid pulses, no hepatojugular reflux and no JVD present. Carotid bruit is not present.   Cardiovascular: Normal rate. An irregularly irregular rhythm present.   Murmur heard.   Systolic (RUSB and LUSB 1-2/6) murmur is " present.  Pulses:       Radial pulses are 2+ on the right side, and 2+ on the left side.        Dorsalis pedis pulses are 2+ on the right side, and 2+ on the left side.        Posterior tibial pulses are 2+ on the right side, and 2+ on the left side.   1+ edema BLE.    Pulmonary/Chest: Effort normal and breath sounds normal.   Abdominal: Normal appearance and bowel sounds are normal.   Neurological: She is alert and oriented to person, place, and time.   Skin: Skin is warm, dry and intact. There is erythema (RLE with hardened area ).   Psychiatric: She has a normal mood and affect. Her speech is normal and behavior is normal. Judgment and thought content normal. Cognition and memory are normal.       Procedure     ECG 12 Lead  Date/Time: 6/7/2019 10:30 AM  Performed by: Jeana Gonsalez APRN  Authorized by: Jeana Gonsalez APRN   Comparison: compared with previous ECG from 9/5/2018  Similar to previous ECG  Rhythm: atrial fibrillation  Rate: normal  BPM: 96  QRS axis: normal    Clinical impression: abnormal EKG  Comments: QT/QTc 340/393                 Assessment/Plan      Diagnosis Plan   1. Other chest pain  Stress Test With Myocardial Perfusion One Day    Adult Transthoracic Echo Complete W/ Cont if Necessary Per Protocol   2. Paroxysmal atrial fibrillation (CMS/HCC)  Stress Test With Myocardial Perfusion One Day    Adult Transthoracic Echo Complete W/ Cont if Necessary Per Protocol    TSH    Magnesium   3. Essential hypertension  potassium chloride (K-DUR) 10 MEQ CR tablet    metoprolol tartrate (LOPRESSOR) 25 MG tablet    Stress Test With Myocardial Perfusion One Day    Adult Transthoracic Echo Complete W/ Cont if Necessary Per Protocol    Comprehensive Metabolic Panel   4. Hyperlipidemia, unspecified hyperlipidemia type  Stress Test With Myocardial Perfusion One Day    Adult Transthoracic Echo Complete W/ Cont if Necessary Per Protocol    Lipid Panel   5. DEEPAK on CPAP  Stress Test With Myocardial Perfusion One  Day    Adult Transthoracic Echo Complete W/ Cont if Necessary Per Protocol   6. Peripheral edema  furosemide (LASIX) 40 MG tablet    Stress Test With Myocardial Perfusion One Day    Adult Transthoracic Echo Complete W/ Cont if Necessary Per Protocol   7. Cellulitis of right lower extremity  cephalexin (KEFLEX) 500 MG capsule   8. Shortness of breath  Stress Test With Myocardial Perfusion One Day    Adult Transthoracic Echo Complete W/ Cont if Necessary Per Protocol   9. Healthcare maintenance  Comprehensive Metabolic Panel    Lipid Panel    TSH    Magnesium   10. Precordial pain  nitroglycerin (NITROSTAT) 0.4 MG SL tablet       Return in about 12 weeks (around 8/30/2019).  Chest pain/A. fib/hypertension/hyperlipidemia/peripheral edema/shortness of breath-patient will have ischemia work-up, stress and echo.  She will also get TSH, lipid, CMP and magnesium.  She will continue to use her nitro as needed for chest pain no resolution she will go to the ER.  Cellulitis right lower extremity-patient will take Keflex.  She will continue her medication regimen otherwise.  She will follow-up in 12 weeks or sooner if any changes.         Patient's Body mass index is 43.08 kg/m². BMI is above normal parameters. Recommendations include: educational material and referral to primary care.      Electronically signed by:

## 2019-06-10 ENCOUNTER — LAB (OUTPATIENT)
Dept: LAB | Facility: HOSPITAL | Age: 64
End: 2019-06-10

## 2019-06-10 DIAGNOSIS — I10 ESSENTIAL HYPERTENSION: ICD-10-CM

## 2019-06-10 DIAGNOSIS — I48.0 PAROXYSMAL ATRIAL FIBRILLATION (HCC): ICD-10-CM

## 2019-06-10 DIAGNOSIS — E78.5 HYPERLIPIDEMIA, UNSPECIFIED HYPERLIPIDEMIA TYPE: ICD-10-CM

## 2019-06-10 DIAGNOSIS — Z00.00 HEALTHCARE MAINTENANCE: ICD-10-CM

## 2019-06-10 LAB
ALBUMIN SERPL-MCNC: 4.04 G/DL (ref 3.5–5.2)
ALBUMIN/GLOB SERPL: 1.3 G/DL
ALP SERPL-CCNC: 104 U/L (ref 39–117)
ALT SERPL W P-5'-P-CCNC: 23 U/L (ref 1–33)
ANION GAP SERPL CALCULATED.3IONS-SCNC: 12.7 MMOL/L
AST SERPL-CCNC: 27 U/L (ref 1–32)
BILIRUB SERPL-MCNC: 1.1 MG/DL (ref 0.2–1.2)
BUN BLD-MCNC: 20 MG/DL (ref 8–23)
BUN/CREAT SERPL: 29 (ref 7–25)
CALCIUM SPEC-SCNC: 10.3 MG/DL (ref 8.6–10.5)
CHLORIDE SERPL-SCNC: 102 MMOL/L (ref 98–107)
CHOLEST SERPL-MCNC: 107 MG/DL (ref 0–200)
CO2 SERPL-SCNC: 27.3 MMOL/L (ref 22–29)
CREAT BLD-MCNC: 0.69 MG/DL (ref 0.57–1)
GFR SERPL CREATININE-BSD FRML MDRD: 86 ML/MIN/1.73
GLOBULIN UR ELPH-MCNC: 3.2 GM/DL
GLUCOSE BLD-MCNC: 96 MG/DL (ref 65–99)
HDLC SERPL-MCNC: 42 MG/DL (ref 40–60)
LDLC SERPL CALC-MCNC: 51 MG/DL (ref 0–100)
LDLC/HDLC SERPL: 1.21 {RATIO}
MAGNESIUM SERPL-MCNC: 1.8 MG/DL (ref 1.6–2.4)
POTASSIUM BLD-SCNC: 4.8 MMOL/L (ref 3.5–5.2)
PROT SERPL-MCNC: 7.2 G/DL (ref 6–8.5)
SODIUM BLD-SCNC: 142 MMOL/L (ref 136–145)
TRIGL SERPL-MCNC: 70 MG/DL (ref 0–150)
TSH SERPL DL<=0.05 MIU/L-ACNC: <0.005 MIU/ML (ref 0.27–4.2)
VLDLC SERPL-MCNC: 14 MG/DL

## 2019-06-10 PROCEDURE — 80061 LIPID PANEL: CPT | Performed by: NURSE PRACTITIONER

## 2019-06-10 PROCEDURE — 83735 ASSAY OF MAGNESIUM: CPT | Performed by: NURSE PRACTITIONER

## 2019-06-10 PROCEDURE — 80053 COMPREHEN METABOLIC PANEL: CPT | Performed by: NURSE PRACTITIONER

## 2019-06-10 PROCEDURE — 36415 COLL VENOUS BLD VENIPUNCTURE: CPT

## 2019-06-10 PROCEDURE — 84443 ASSAY THYROID STIM HORMONE: CPT | Performed by: NURSE PRACTITIONER

## 2019-06-11 ENCOUNTER — TELEPHONE (OUTPATIENT)
Dept: CARDIOLOGY | Facility: CLINIC | Age: 64
End: 2019-06-11

## 2019-06-11 NOTE — TELEPHONE ENCOUNTER
----- Message from CACHORRO Perera sent at 6/11/2019  7:07 AM EDT -----  Thyroid is overactive.  Needs to follow-up with PCP re: this.  Forward results.

## 2019-06-12 ENCOUNTER — TELEPHONE (OUTPATIENT)
Dept: CARDIOLOGY | Facility: CLINIC | Age: 64
End: 2019-06-12

## 2019-06-28 DIAGNOSIS — I77.9 BILATERAL CAROTID ARTERY DISEASE (HCC): ICD-10-CM

## 2019-07-01 RX ORDER — LOVASTATIN 20 MG/1
20 TABLET ORAL
Qty: 90 TABLET | Refills: 3 | Status: SHIPPED | OUTPATIENT
Start: 2019-07-01 | End: 2019-07-11 | Stop reason: SDUPTHER

## 2019-07-09 ENCOUNTER — HOSPITAL ENCOUNTER (OUTPATIENT)
Dept: CARDIOLOGY | Facility: HOSPITAL | Age: 64
Discharge: HOME | End: 2019-07-09

## 2019-07-09 VITALS — WEIGHT: 227.07 LBS | HEIGHT: 61 IN | BODY MASS INDEX: 42.87 KG/M2

## 2019-07-09 DIAGNOSIS — G47.33 OSA ON CPAP: ICD-10-CM

## 2019-07-09 DIAGNOSIS — E78.5 HYPERLIPIDEMIA, UNSPECIFIED HYPERLIPIDEMIA TYPE: ICD-10-CM

## 2019-07-09 DIAGNOSIS — Z99.89 OSA ON CPAP: ICD-10-CM

## 2019-07-09 DIAGNOSIS — I10 ESSENTIAL HYPERTENSION: ICD-10-CM

## 2019-07-09 DIAGNOSIS — I48.0 PAROXYSMAL ATRIAL FIBRILLATION (HCC): ICD-10-CM

## 2019-07-09 DIAGNOSIS — R07.89 OTHER CHEST PAIN: ICD-10-CM

## 2019-07-09 DIAGNOSIS — R06.02 SHORTNESS OF BREATH: ICD-10-CM

## 2019-07-09 DIAGNOSIS — R60.9 PERIPHERAL EDEMA: ICD-10-CM

## 2019-07-09 PROCEDURE — 78452 HT MUSCLE IMAGE SPECT MULT: CPT

## 2019-07-09 PROCEDURE — 93017 CV STRESS TEST TRACING ONLY: CPT

## 2019-07-09 PROCEDURE — 93306 TTE W/DOPPLER COMPLETE: CPT

## 2019-07-09 PROCEDURE — 78452 HT MUSCLE IMAGE SPECT MULT: CPT | Performed by: INTERNAL MEDICINE

## 2019-07-09 PROCEDURE — 0 TECHNETIUM SESTAMIBI: Performed by: INTERNAL MEDICINE

## 2019-07-09 PROCEDURE — A9500 TC99M SESTAMIBI: HCPCS | Performed by: INTERNAL MEDICINE

## 2019-07-09 PROCEDURE — 93306 TTE W/DOPPLER COMPLETE: CPT | Performed by: INTERNAL MEDICINE

## 2019-07-09 PROCEDURE — 93018 CV STRESS TEST I&R ONLY: CPT | Performed by: INTERNAL MEDICINE

## 2019-07-09 PROCEDURE — 25010000002 REGADENOSON 0.4 MG/5ML SOLUTION: Performed by: INTERNAL MEDICINE

## 2019-07-09 RX ADMIN — REGADENOSON 0.4 MG: 0.08 INJECTION, SOLUTION INTRAVENOUS at 12:03

## 2019-07-09 RX ADMIN — TECHNETIUM TC 99M SESTAMIBI 1 DOSE: 1 INJECTION INTRAVENOUS at 09:04

## 2019-07-09 RX ADMIN — TECHNETIUM TC 99M SESTAMIBI 1 DOSE: 1 INJECTION INTRAVENOUS at 12:03

## 2019-07-10 LAB
BH CV NUCLEAR PRIOR STUDY: 3
BH CV STRESS BP STAGE 1: NORMAL
BH CV STRESS COMMENTS STAGE 1: NORMAL
BH CV STRESS DOSE REGADENOSON STAGE 1: 0.4
BH CV STRESS DURATION MIN STAGE 1: 0
BH CV STRESS DURATION SEC STAGE 1: 10
BH CV STRESS HR STAGE 1: 82
BH CV STRESS PROTOCOL 1: NORMAL
BH CV STRESS RECOVERY BP: NORMAL MMHG
BH CV STRESS RECOVERY HR: 83 BPM
BH CV STRESS STAGE 1: 1
MAXIMAL PREDICTED HEART RATE: 159 BPM
PERCENT MAX PREDICTED HR: 59.75 %
STRESS BASELINE BP: NORMAL MMHG
STRESS BASELINE HR: 80 BPM
STRESS PERCENT HR: 70 %
STRESS POST PEAK BP: NORMAL MMHG
STRESS POST PEAK HR: 95 BPM
STRESS TARGET HR: 135 BPM

## 2019-07-11 ENCOUNTER — OFFICE VISIT (OUTPATIENT)
Dept: CARDIOLOGY | Facility: CLINIC | Age: 64
End: 2019-07-11

## 2019-07-11 ENCOUNTER — LAB (OUTPATIENT)
Dept: LAB | Facility: HOSPITAL | Age: 64
End: 2019-07-11

## 2019-07-11 ENCOUNTER — TELEPHONE (OUTPATIENT)
Dept: CARDIOLOGY | Facility: CLINIC | Age: 64
End: 2019-07-11

## 2019-07-11 VITALS
SYSTOLIC BLOOD PRESSURE: 111 MMHG | OXYGEN SATURATION: 91 % | HEIGHT: 61 IN | WEIGHT: 221 LBS | HEART RATE: 95 BPM | BODY MASS INDEX: 41.72 KG/M2 | DIASTOLIC BLOOD PRESSURE: 67 MMHG

## 2019-07-11 DIAGNOSIS — I10 ESSENTIAL HYPERTENSION: ICD-10-CM

## 2019-07-11 DIAGNOSIS — Z00.00 HEALTHCARE MAINTENANCE: ICD-10-CM

## 2019-07-11 DIAGNOSIS — R07.89 OTHER CHEST PAIN: ICD-10-CM

## 2019-07-11 DIAGNOSIS — Z99.89 OSA ON CPAP: ICD-10-CM

## 2019-07-11 DIAGNOSIS — G47.33 OSA ON CPAP: ICD-10-CM

## 2019-07-11 DIAGNOSIS — E78.5 HYPERLIPIDEMIA, UNSPECIFIED HYPERLIPIDEMIA TYPE: ICD-10-CM

## 2019-07-11 DIAGNOSIS — R94.39 ABNORMAL STRESS TEST: Primary | ICD-10-CM

## 2019-07-11 DIAGNOSIS — R60.9 PERIPHERAL EDEMA: ICD-10-CM

## 2019-07-11 DIAGNOSIS — I48.0 PAROXYSMAL ATRIAL FIBRILLATION (HCC): ICD-10-CM

## 2019-07-11 DIAGNOSIS — I77.9 BILATERAL CAROTID ARTERY DISEASE (HCC): ICD-10-CM

## 2019-07-11 PROCEDURE — 80048 BASIC METABOLIC PNL TOTAL CA: CPT | Performed by: NURSE PRACTITIONER

## 2019-07-11 PROCEDURE — 36415 COLL VENOUS BLD VENIPUNCTURE: CPT

## 2019-07-11 PROCEDURE — 85027 COMPLETE CBC AUTOMATED: CPT | Performed by: NURSE PRACTITIONER

## 2019-07-11 PROCEDURE — 99214 OFFICE O/P EST MOD 30 MIN: CPT | Performed by: NURSE PRACTITIONER

## 2019-07-11 RX ORDER — CLOPIDOGREL BISULFATE 75 MG/1
75 TABLET ORAL DAILY
Qty: 30 TABLET | Refills: 11 | Status: SHIPPED | OUTPATIENT
Start: 2019-07-11 | End: 2019-07-11

## 2019-07-11 RX ORDER — CYANOCOBALAMIN (VITAMIN B-12) 500 MCG
400 LOZENGE ORAL DAILY
COMMUNITY

## 2019-07-11 RX ORDER — MULTIVIT-MIN/IRON/FOLIC ACID/K 18-600-40
1000 CAPSULE ORAL DAILY
COMMUNITY

## 2019-07-11 RX ORDER — BIOTIN 5 MG
5 TABLET ORAL DAILY
COMMUNITY

## 2019-07-11 RX ORDER — LOVASTATIN 20 MG/1
20 TABLET ORAL
Qty: 90 TABLET | Refills: 3 | Status: SHIPPED | OUTPATIENT
Start: 2019-07-11

## 2019-07-11 RX ORDER — TURMERIC 100 %
POWDER (GRAM) MISCELLANEOUS DAILY
COMMUNITY

## 2019-07-11 RX ORDER — LANOLIN ALCOHOL/MO/W.PET/CERES
1000 CREAM (GRAM) TOPICAL DAILY
COMMUNITY

## 2019-07-11 RX ORDER — FUROSEMIDE 20 MG/1
TABLET ORAL
Qty: 90 TABLET | Refills: 3 | Status: SHIPPED | OUTPATIENT
Start: 2019-07-11

## 2019-07-11 NOTE — TELEPHONE ENCOUNTER
----- Message from CACHORRO Perera sent at 7/10/2019  9:12 PM EDT -----  See if patient can come in today, Thurs @ 10:45 am.

## 2019-07-11 NOTE — TELEPHONE ENCOUNTER
Stress:  1.  There is a moderate sized, moderately dense ischemic defect involving the distal anterior wall of the anteroapical and apical segments.  There is also lateral wall ischemia.     2.  Preserved post stress ejection fraction of 73% with no focal wall motion abnormalities.     3.  No evidence of pharmacologically-induced ischemic dilation nor of increased lung uptake of radiopharmaceutical.

## 2019-07-11 NOTE — PROGRESS NOTES
Subjective   Christina Holbrook is a 63 y.o. female     Chief Complaint   Patient presents with   • Follow-up     Follow up on testing        HPI    Problem List:    1. Persistent Afib (CHADS 2 - Eliquis)  2. Hypertension  2.1 Echo 5/3/16 - EF 60-65%; trace MR; mild TR; PA 58  2.2 Echo 10/6/16 - EF 65%; mild TR; PA 35-45  2.3 Echo 2/22/18-mild LVH, EF greater than 65%, diastolic dysfunction 2, mild to moderate MR, mild TR, mild AR, PA 40-45  3. Pulmonary Hypertension  4. Chronic BLE Edema   4.1 BLE Arterial U/S 5/5/17 - no evidence of sig atheroma obst disease at nay level on either side.   5. H/O CHF   6. Chest Pain  6.1 Stress Test 5/3/16 - small fixed defect in mid anterolateral, apical lateral segments; extent of defect mild to mod; post stress EF 40%  6.2 Stress Test 1/4/17 - no ischemia; low risk  6.3 Stress test 2/22/18-no ischemia, low risk  6.4 stress test 7/9/19-moderate sized, moderately dense ischemic defect involving the distal anterior wall of the anterior apical and apical segments.  There is also lateral wall ischemia.  Preserved post-rest EF 73%  7. DEEPAK - CPAP   8. Carotid Artery Disease   8.1 Carotid Artery U/S 5/5/17 - bilateral bifurcation 16-49%; less than 15% both ICA. Antegrade flow both vertebral arteries  9.  Dyslipidemia    Patient is a 63-year-old female who presents today for follow-up on stress test.  Patient has not had any further episodes of chest pain for about a month now.  However she was having left anterior sharp and then achy feeling.  She said she would take a nitroglycerin and it would resolve.  She did get lightheaded and short of breath when this happened.  She says it can happen anytime.  She says she has an occasional flutter.  She denies any dizziness, presyncope, syncope, orthopnea or PND.  She does get swelling which does not typically resolve overnight.  She says she does have shortness of breath but mostly just with her chest pain.  She denies any signs of  bleeding or cerebral ischemic events.    We went over stress test.    Current Outpatient Medications   Medication Sig Dispense Refill   • apixaban (ELIQUIS) 5 MG tablet tablet Take 1 tablet by mouth Every 12 (Twelve) Hours. 180 tablet 3   • Ascorbic Acid (VITAMIN C) 500 MG capsule Take 1,000 mg by mouth Daily.     • Biotin 5 MG tablet Take 5 mg by mouth Daily.     • Calcium Carbonate-Vitamin D (CALTRATE 600+D PO) Take  by mouth Daily.     • furosemide (LASIX) 20 MG tablet Take 1 20mg tab, PRN if increased edema over night. 90 tablet 3   • furosemide (LASIX) 40 MG tablet Take 1 tablet by mouth Daily. 90 tablet 3   • lovastatin (MEVACOR) 20 MG tablet Take 1 tablet by mouth Daily With Dinner. 90 tablet 3   • metoprolol tartrate (LOPRESSOR) 25 MG tablet Take 1 tablet by mouth 2 (Two) Times a Day. 180 tablet 3   • Multiple Vitamins-Minerals (MULTIVITAMIN ADULT PO) Take  by mouth Daily.     • nitroglycerin (NITROSTAT) 0.4 MG SL tablet DISSOLVE ONE TABLET UNDER THE TONGUE EVERY 5 MINUTES AS NEEDED FOR CHEST PAIN.  DO NOT EXCEED A TOTAL OF 3 DOSES IN 15 MINUTES 25 tablet 3   • potassium chloride (K-DUR) 10 MEQ CR tablet Take 1 tablet by mouth Daily. 90 tablet 3   • spironolactone (ALDACTONE) 25 MG tablet Take 1 tablet by mouth Daily. 30 tablet 11   • Turmeric powder Daily.     • vitamin B-12 (CYANOCOBALAMIN) 1000 MCG tablet Take 1,000 mcg by mouth Daily.     • Vitamin E 400 units tablet Take 400 Units by mouth Daily.     • aspirin 81 MG tablet Take 1 tablet by mouth Daily. 30 tablet 11     No current facility-administered medications for this visit.        ALLERGIES    Naproxen    Past Medical History:   Diagnosis Date   • A-fib (CMS/HCC)    • CHF (congestive heart failure) (CMS/HCC)    • Diabetes mellitus (CMS/HCC)    • Disease of thyroid gland    • DVT (deep venous thrombosis) (CMS/HCC)     hx   • Essential hypertension 12/1/2017   • Pulmonary HTN (CMS/HCC)     severe       Social History     Socioeconomic History   •  Marital status:      Spouse name: Not on file   • Number of children: Not on file   • Years of education: Not on file   • Highest education level: Not on file   Tobacco Use   • Smoking status: Former Smoker     Packs/day: 1.00     Types: Cigarettes     Last attempt to quit:      Years since quittin.5   • Smokeless tobacco: Never Used   Substance and Sexual Activity   • Alcohol use: No   • Drug use: No   • Sexual activity: Defer       Family History   Problem Relation Age of Onset   • Heart disease Father    • Heart disease Sister    • Heart disease Brother        Review of Systems   Constitutional: Negative for chills, fatigue and fever.   HENT: Negative for congestion, rhinorrhea and sore throat.    Eyes: Negative for visual disturbance.   Respiratory: Positive for apnea (Uses Cpap nightly ) and shortness of breath (just a little with CP ). Negative for chest tightness.    Cardiovascular: Positive for chest pain (No chest pain in the last month; prior to that left anterior sharp and then ache took nitro and helped ), palpitations (Occasional flutters ) and leg swelling (LE edema which doesn't resolve overnight ).   Gastrointestinal: Negative for abdominal pain, blood in stool, nausea and vomiting.   Endocrine: Negative for cold intolerance and heat intolerance.   Genitourinary: Negative for dysuria, frequency, hematuria and urgency.   Musculoskeletal: Positive for arthralgias (Right shoulder ). Negative for back pain and neck pain.   Skin: Negative for rash and wound.   Allergic/Immunologic: Negative for environmental allergies and food allergies.   Neurological: Positive for light-headedness (just little with CP). Negative for dizziness and weakness.   Hematological: Bruises/bleeds easily (Bleed easily ).   Psychiatric/Behavioral: Negative for sleep disturbance (Denies waking with smothering ).       Objective   /67 (BP Location: Left arm, Patient Position: Sitting)   Pulse 95   Ht 154.9 cm  "(61\")   Wt 100 kg (221 lb)   SpO2 91%   BMI 41.76 kg/m²   Vitals:    07/11/19 1034   BP: 111/67   BP Location: Left arm   Patient Position: Sitting   Pulse: 95   SpO2: 91%   Weight: 100 kg (221 lb)   Height: 154.9 cm (61\")      Lab Results (most recent)     None        Physical Exam   Constitutional: She is oriented to person, place, and time. Vital signs are normal. She appears well-developed and well-nourished. She is active and cooperative.   HENT:   Head: Normocephalic.   Eyes: Lids are normal.   Neck: Normal carotid pulses, no hepatojugular reflux and no JVD present. Carotid bruit is not present.   Cardiovascular: Normal rate, regular rhythm and normal heart sounds.   Pulses:       Radial pulses are 2+ on the right side, and 2+ on the left side.        Dorsalis pedis pulses are 2+ on the right side, and 2+ on the left side.        Posterior tibial pulses are 2+ on the right side, and 2+ on the left side.   Trace edema BLE.   Pulmonary/Chest: Effort normal and breath sounds normal.   Abdominal: Normal appearance and bowel sounds are normal.   Neurological: She is alert and oriented to person, place, and time.   Skin: Skin is warm, dry and intact.   Psychiatric: She has a normal mood and affect. Her speech is normal and behavior is normal. Judgment and thought content normal. Cognition and memory are normal.       Procedure   Procedures         Assessment/Plan      Diagnosis Plan   1. Abnormal stress test  [facility]    aspirin 81 MG tablet   2. Paroxysmal atrial fibrillation (CMS/HCC)  [facility]   3. Essential hypertension  [facility]    Basic Metabolic Panel    CBC (No Diff)   4. Hyperlipidemia, unspecified hyperlipidemia type  [facility]   5. DEEPAK on CPAP  [facility]   6. Peripheral edema  furosemide (LASIX) 20 MG tablet    [facility]   7. Bilateral carotid artery disease (CMS/HCC)  lovastatin (MEVACOR) 20 MG tablet    [facility]   8. " Healthcare maintenance  Basic Metabolic Panel    CBC (No Diff)   9. Other chest pain  [facility]    aspirin 81 MG tablet       Return 1 week after [facility] .  Abnormal stress test/A. fib/hypertension/hyperlipidemia/peripheral edema/chest pain-patient will have left heart cath.  She will start aspirin 81.  She will hold her Eliquis for 3 days prior to heart cath.  I did advise if she has since most likely she will be switched from aspirin to Plavix but she would like to wait until that time.  She will get a BMP and CBC prior to heart cath.  She can use nitro HI for chest pain no resolution she is to go to the ER.  She will follow-up after left heart catheter sooner if any changes.       Patient's Body mass index is 41.76 kg/m². BMI is above normal parameters. Recommendations include: educational material and referral to primary care.      Electronically signed by:

## 2019-07-11 NOTE — PATIENT INSTRUCTIONS
"Fat and Cholesterol Restricted Eating Plan  Getting too much fat and cholesterol in your diet may cause health problems. Choosing the right foods helps keep your fat and cholesterol at normal levels. This can keep you from getting certain diseases.  Your doctor may recommend an eating plan that includes:  · Total fat: ______% or less of total calories a day.  · Saturated fat: ______% or less of total calories a day.  · Cholesterol: less than _________mg a day.  · Fiber: ______g a day.    What are tips for following this plan?  General tips  · Work with your doctor to lose weight if you need to.  · Avoid:  ? Foods with added sugar.  ? Fried foods.  ? Foods with partially hydrogenated oils.  · Limit alcohol intake to no more than 1 drink a day for nonpregnant women and 2 drinks a day for men. One drink equals 12 oz of beer, 5 oz of wine, or 1½ oz of hard liquor.  Reading food labels  · Check food labels for:  ? Trans fats.  ? Partially hydrogenated oils.  ? Saturated fat (g) in each serving.  ? Cholesterol (mg) in each serving.  ? Fiber (g) in each serving.  · Choose foods with healthy fats, such as:  ? Monounsaturated fats.  ? Polyunsaturated fats.  ? Omega-3 fats.  · Choose grain products that have whole grains. Look for the word \"whole\" as the first word in the ingredient list.  Cooking  · Cook foods using low-fat methods. These include baking, boiling, grilling, and broiling.  · Eat more home-cooked foods. Eat at restaurants and buffets less often.  · Avoid cooking using saturated fats, such as butter, cream, palm oil, palm kernel oil, and coconut oil.  Meal planning  · At meals, divide your plate into four equal parts:  ? Fill one-half of your plate with vegetables and green salads.  ? Fill one-fourth of your plate with whole grains.  ? Fill one-fourth of your plate with low-fat (lean) protein foods.  · Eat fish that is high in omega-3 fats at least two times a week. This includes mackerel, tuna, sardines, and " salmon.  · Eat foods that are high in fiber, such as whole grains, beans, apples, broccoli, carrots, peas, and barley.  Recommended foods  Grains  · Whole grains, such as whole wheat or whole grain breads, crackers, cereals, and pasta. Unsweetened oatmeal, bulgur, barley, quinoa, or brown rice. Corn or whole wheat flour tortillas.  Vegetables  · Fresh or frozen vegetables (raw, steamed, roasted, or grilled). Green salads.  Fruits  · All fresh, canned (in natural juice), or frozen fruits.  Meats and other protein foods  · Ground beef (85% or leaner), grass-fed beef, or beef trimmed of fat. Skinless chicken or turkey. Ground chicken or turkey. Pork trimmed of fat. All fish and seafood. Egg whites. Dried beans, peas, or lentils. Unsalted nuts or seeds. Unsalted canned beans. Nut butters without added sugar or oil.  Dairy  · Low-fat or nonfat dairy products, such as skim or 1% milk, 2% or reduced-fat cheeses, low-fat and fat-free ricotta or cottage cheese, or plain low-fat and nonfat yogurt.  Fats and oils  · Tub margarine without trans fats. Light or reduced-fat mayonnaise and salad dressings. Avocado. Olive, canola, sesame, or safflower oils.  The items listed above may not be a complete list of recommended foods or beverages. Contact your dietitian for more options.  Foods to avoid  Grains  · White bread. White pasta. White rice. Cornbread. Bagels, pastries, and croissants. Crackers and snack foods that contain trans fat and hydrogenated oils.  Vegetables  · Vegetables cooked in cheese, cream, or butter sauce. Fried vegetables.  Fruits  · Canned fruit in heavy syrup. Fruit in cream or butter sauce. Fried fruit.  Meats and other protein foods  · Fatty cuts of meat. Ribs, chicken wings, maldonado, sausage, bologna, salami, chitterlings, fatback, hot dogs, bratwurst, and packaged lunch meats. Liver and organ meats. Whole eggs and egg yolks. Chicken and turkey with skin. Fried meat.  Dairy  · Whole or 2% milk, cream,  half-and-half, and cream cheese. Whole milk cheeses. Whole-fat or sweetened yogurt. Full-fat cheeses. Nondairy creamers and whipped toppings. Processed cheese, cheese spreads, and cheese curds.  Beverages  · Alcohol. Sugar-sweetened drinks such as sodas, lemonade, and fruit drinks.  Fats and oils  · Butter, stick margarine, lard, shortening, ghee, or maldonado fat. Coconut, palm kernel, and palm oils.  Sweets and desserts  · Corn syrup, sugars, honey, and molasses. Candy. Jam and jelly. Syrup. Sweetened cereals. Cookies, pies, cakes, donuts, muffins, and ice cream.  The items listed above may not be a complete list of foods and beverages to avoid. Contact your dietitian for more information.  Summary  · Choosing the right foods helps keep your fat and cholesterol at normal levels. This can keep you from getting certain diseases.  · At meals, fill one-half of your plate with vegetables and green salads.  · Eat high-fiber foods, like whole grains, beans, apples, carrots, peas, and barley.  · Limit added sugar, saturated fats, alcohol, and fried foods.  This information is not intended to replace advice given to you by your health care provider. Make sure you discuss any questions you have with your health care provider.  Document Released: 06/18/2013 Document Revised: 09/04/2018 Document Reviewed: 09/04/2018  [employer] Interactive Patient Education © 2019 [employer]  BMI for Adults  Body mass index (BMI) is a number that is calculated from a person's weight and height. In most adults, the number is used to find how much of an adult's weight is made up of fat. BMI is not as accurate as a direct measure of body fat.  How is BMI calculated?  BMI is calculated by dividing weight in kilograms by height in meters squared. It can also be calculated by dividing weight in pounds by height in inches squared, then multiplying the resulting number by 703. Charts are available to help you find your BMI quickly and easily without  doing this calculation.  How is BMI interpreted?  Health care professionals use BMI charts to identify whether an adult is underweight, at a normal weight, or overweight based on the following guidelines:  · Underweight: BMI less than 18.5.  · Normal weight: BMI between 18.5 and 24.9.  · Overweight: BMI between 25 and 29.9.  · Obese: BMI of 30 and above.    BMI is usually interpreted the same for males and females.  Weight includes both fat and muscle, so someone with a muscular build, such as an athlete, may have a BMI that is higher than 24.9. In cases like these, BMI may not accurately depict body fat. To determine if excess body fat is the cause of a BMI of 25 or higher, further assessments may need to be done by a health care provider.  Why is BMI a useful tool?  BMI is used to identify a possible weight problem that may be related to a medical problem or may increase the risk for medical problems. BMI can also be used to promote changes to reach a healthy weight.  This information is not intended to replace advice given to you by your health care provider. Make sure you discuss any questions you have with your health care provider.  Document Released: 08/29/2005 Document Revised: 04/27/2017 Document Reviewed: 05/15/2015  [employer] Interactive Patient Education © 2018 [employer]    Coronary Angiogram With Stent  Coronary angiogram with stent placement is a procedure to widen or open a narrow blood vessel of the heart (coronary artery). Arteries may become blocked by cholesterol buildup (plaques) in the lining of the wall. When a coronary artery becomes partially blocked, blood flow to that area decreases. This may lead to chest pain or a heart attack (myocardial infarction).  A stent is a small piece of metal that looks like mesh or a spring. Stent placement may be done as treatment for a heart attack or right after a coronary angiogram in which a blocked artery is found.  Let your health care provider know  about:  · Any allergies you have.  · All medicines you are taking, including vitamins, herbs, eye drops, creams, and over-the-counter medicines.  · Any problems you or family members have had with anesthetic medicines.  · Any blood disorders you have.  · Any surgeries you have had.  · Any medical conditions you have.  · Whether you are pregnant or may be pregnant.  What are the risks?  Generally, this is a safe procedure. However, problems may occur, including:  · Damage to the heart or its blood vessels.  · A return of blockage.  · Bleeding, infection, or bruising at the insertion site.  · A collection of blood under the skin (hematoma) at the insertion site.  · A blood clot in another part of the body.  · Kidney injury.  · Allergic reaction to the dye or contrast that is used.  · Bleeding into the abdomen (retroperitoneal bleeding).    What happens before the procedure?  Staying hydrated  Follow instructions from your health care provider about hydration, which may include:  · Up to 2 hours before the procedure - you may continue to drink clear liquids, such as water, clear fruit juice, black coffee, and plain tea.    Eating and drinking restrictions  Follow instructions from your health care provider about eating and drinking, which may include:  · 8 hours before the procedure - stop eating heavy meals or foods such as meat, fried foods, or fatty foods.  · 6 hours before the procedure - stop eating light meals or foods, such as toast or cereal.  · 2 hours before the procedure - stop drinking clear liquids.    Ask your health care provider about:  · Changing or stopping your regular medicines. This is especially important if you are taking diabetes medicines or blood thinners.  · Taking medicines such as ibuprofen. These medicines can thin your blood. Do not take these medicines before your procedure if your health care provider instructs you not to. Generally, aspirin is recommended before a procedure of passing  a small, thin tube (catheter) through a blood vessel and into the heart (cardiac catheterization).    What happens during the procedure?  · An IV tube will be inserted into one of your veins.  · You will be given one or more of the following:  ? A medicine to help you relax (sedative).  ? A medicine to numb the area where the catheter will be inserted into an artery (local anesthetic).  · To reduce your risk of infection:  ? Your health care team will wash or sanitize their hands.  ? Your skin will be washed with soap.  ? Hair may be removed from the area where the catheter will be inserted.  · Using a guide wire, the catheter will be inserted into an artery. The location may be in your groin, in your wrist, or in the fold of your arm (near your elbow).  · A type of X-ray (fluoroscopy) will be used to help guide the catheter to the opening of the arteries in the heart.  · A dye will be injected into the catheter, and X-rays will be taken. The dye will help to show where any narrowing or blockages are located in the arteries.  · A tiny wire will be guided to the blocked spot, and a balloon will be inflated to make the artery wider.  · The stent will be expanded and will crush the plaques into the wall of the vessel. The stent will hold the area open and improve the blood flow. Most stents have a drug coating to reduce the risk of the stent narrowing over time.  · The artery may be made wider using a drill, laser, or other tools to remove plaques.  · When the blood flow is better, the catheter will be removed. The lining of the artery will grow over the stent, which stays where it was placed.  This procedure may vary among health care providers and hospitals.  What happens after the procedure?  · If the procedure is done through the leg, you will be kept in bed lying flat for about 6 hours. You will be instructed to not bend and not cross your legs.  · The insertion site will be checked frequently.  · The pulse in  your foot or wrist will be checked frequently.  · You may have additional blood tests, X-rays, and a test that records the electrical activity of your heart (electrocardiogram, or ECG).  This information is not intended to replace advice given to you by your health care provider. Make sure you discuss any questions you have with your health care provider.  Document Released: 06/23/2004 Document Revised: 08/17/2017 Document Reviewed: 07/23/2017  [employer] Interactive Patient Education © 2019 [employer]

## 2019-07-12 LAB
ANION GAP SERPL CALCULATED.3IONS-SCNC: 15.1 MMOL/L (ref 5–15)
BH CV ECHO MEAS - ACS: 1.5 CM
BH CV ECHO MEAS - AO MAX PG (FULL): 22.8 MMHG
BH CV ECHO MEAS - AO MAX PG: 28.8 MMHG
BH CV ECHO MEAS - AO MEAN PG (FULL): 10 MMHG
BH CV ECHO MEAS - AO MEAN PG: 12.5 MMHG
BH CV ECHO MEAS - AO ROOT AREA (BSA CORRECTED): 1.3
BH CV ECHO MEAS - AO ROOT AREA: 5.1 CM^2
BH CV ECHO MEAS - AO ROOT DIAM: 2.5 CM
BH CV ECHO MEAS - AO V2 MAX: 268.1 CM/SEC
BH CV ECHO MEAS - AO V2 MEAN: 162.3 CM/SEC
BH CV ECHO MEAS - AO V2 VTI: 46 CM
BH CV ECHO MEAS - AVA(I,A): 1.6 CM^2
BH CV ECHO MEAS - AVA(I,D): 1.6 CM^2
BH CV ECHO MEAS - AVA(V,A): 1.4 CM^2
BH CV ECHO MEAS - AVA(V,D): 1.4 CM^2
BH CV ECHO MEAS - BSA(HAYCOCK): 2.2 M^2
BH CV ECHO MEAS - BSA: 2 M^2
BH CV ECHO MEAS - BZI_BMI: 43.1 KILOGRAMS/M^2
BH CV ECHO MEAS - BZI_METRIC_HEIGHT: 154.9 CM
BH CV ECHO MEAS - BZI_METRIC_WEIGHT: 103.4 KG
BH CV ECHO MEAS - CI(CUBED): 6.7 L/MIN/M^2
BH CV ECHO MEAS - CI(TEICH): 6.3 L/MIN/M^2
BH CV ECHO MEAS - CO(CUBED): 13.4 L/MIN
BH CV ECHO MEAS - CO(TEICH): 12.7 L/MIN
BH CV ECHO MEAS - EDV(CUBED): 88.9 ML
BH CV ECHO MEAS - EDV(TEICH): 90.7 ML
BH CV ECHO MEAS - EF(CUBED): 85.4 %
BH CV ECHO MEAS - EF(TEICH): 78.9 %
BH CV ECHO MEAS - ESV(CUBED): 13 ML
BH CV ECHO MEAS - ESV(TEICH): 19.1 ML
BH CV ECHO MEAS - FS: 47.4 %
BH CV ECHO MEAS - IVS/LVPW: 1.4
BH CV ECHO MEAS - IVSD: 1.3 CM
BH CV ECHO MEAS - LA DIMENSION(2D): 5.1 CM
BH CV ECHO MEAS - LA DIMENSION: 5.3 CM
BH CV ECHO MEAS - LA/AO: 2.1
BH CV ECHO MEAS - LAT PEAK E' VEL: 8 CM/SEC
BH CV ECHO MEAS - LV IVRT: 0.07 SEC
BH CV ECHO MEAS - LV MASS(C)D: 170.1 GRAMS
BH CV ECHO MEAS - LV MASS(C)DI: 85.2 GRAMS/M^2
BH CV ECHO MEAS - LV MAX PG: 6 MMHG
BH CV ECHO MEAS - LV MEAN PG: 2.5 MMHG
BH CV ECHO MEAS - LV V1 MAX: 122.2 CM/SEC
BH CV ECHO MEAS - LV V1 MEAN: 70.9 CM/SEC
BH CV ECHO MEAS - LV V1 VTI: 23.8 CM
BH CV ECHO MEAS - LVIDD: 4.5 CM
BH CV ECHO MEAS - LVIDS: 2.3 CM
BH CV ECHO MEAS - LVOT AREA: 3 CM^2
BH CV ECHO MEAS - LVOT DIAM: 2 CM
BH CV ECHO MEAS - LVPWD: 0.93 CM
BH CV ECHO MEAS - MED PEAK E' VEL: 7 CM/SEC
BH CV ECHO MEAS - MITRAL HR: 170 BPM
BH CV ECHO MEAS - MITRAL R-R: 0.35 SEC
BH CV ECHO MEAS - MM HR: 176.9 BPM
BH CV ECHO MEAS - MM R-R INT: 0.34 SEC
BH CV ECHO MEAS - MR MAX PG: 39.6 MMHG
BH CV ECHO MEAS - MR MAX VEL: 314.6 CM/SEC
BH CV ECHO MEAS - MV DEC SLOPE: 530.4 CM/SEC^2
BH CV ECHO MEAS - MV DEC TIME: 0.26 SEC
BH CV ECHO MEAS - MV E MAX VEL: 134.3 CM/SEC
BH CV ECHO MEAS - MV MAX PG: 8.8 MMHG
BH CV ECHO MEAS - MV MEAN PG: 3.5 MMHG
BH CV ECHO MEAS - MV V2 MAX: 148 CM/SEC
BH CV ECHO MEAS - MV V2 MEAN: 81.8 CM/SEC
BH CV ECHO MEAS - MV V2 VTI: 28.9 CM
BH CV ECHO MEAS - MVA(VTI): 2.5 CM^2
BH CV ECHO MEAS - PA MAX PG (FULL): 8.7 MMHG
BH CV ECHO MEAS - PA MAX PG: 14 MMHG
BH CV ECHO MEAS - PA MEAN PG (FULL): 2.3 MMHG
BH CV ECHO MEAS - PA MEAN PG: 4.9 MMHG
BH CV ECHO MEAS - PA V2 MAX: 186.9 CM/SEC
BH CV ECHO MEAS - PA V2 MEAN: 97.7 CM/SEC
BH CV ECHO MEAS - PA V2 VTI: 36.2 CM
BH CV ECHO MEAS - PULM. HR: 162.1 BPM
BH CV ECHO MEAS - PULM. R-R: 0.37 SEC
BH CV ECHO MEAS - RAP SYSTOLE: 10 MMHG
BH CV ECHO MEAS - RV MAX PG: 5.3 MMHG
BH CV ECHO MEAS - RV MEAN PG: 2.6 MMHG
BH CV ECHO MEAS - RV V1 MAX: 114.6 CM/SEC
BH CV ECHO MEAS - RV V1 MEAN: 75.5 CM/SEC
BH CV ECHO MEAS - RV V1 VTI: 30.3 CM
BH CV ECHO MEAS - RVDD: 3.2 CM
BH CV ECHO MEAS - RVSP: 68 MMHG
BH CV ECHO MEAS - SI(AO): 117.3 ML/M^2
BH CV ECHO MEAS - SI(CUBED): 38 ML/M^2
BH CV ECHO MEAS - SI(LVOT): 36 ML/M^2
BH CV ECHO MEAS - SI(TEICH): 35.8 ML/M^2
BH CV ECHO MEAS - SV(AO): 234.3 ML
BH CV ECHO MEAS - SV(CUBED): 76 ML
BH CV ECHO MEAS - SV(LVOT): 71.9 ML
BH CV ECHO MEAS - SV(TEICH): 71.6 ML
BH CV ECHO MEAS - TR MAX VEL: 344.4 CM/SEC
BH CV ECHO MEASUREMENTS AVERAGE E/E' RATIO: 17.91
BUN BLD-MCNC: 36 MG/DL (ref 8–23)
BUN/CREAT SERPL: 45 (ref 7–25)
CALCIUM SPEC-SCNC: 10.9 MG/DL (ref 8.6–10.5)
CHLORIDE SERPL-SCNC: 98 MMOL/L (ref 98–107)
CO2 SERPL-SCNC: 27.9 MMOL/L (ref 22–29)
CREAT BLD-MCNC: 0.8 MG/DL (ref 0.57–1)
DEPRECATED RDW RBC AUTO: 49.9 FL (ref 37–54)
ERYTHROCYTE [DISTWIDTH] IN BLOOD BY AUTOMATED COUNT: 15.1 % (ref 12.3–15.4)
GFR SERPL CREATININE-BSD FRML MDRD: 73 ML/MIN/1.73
GLUCOSE BLD-MCNC: 110 MG/DL (ref 65–99)
HCT VFR BLD AUTO: 46.9 % (ref 34–46.6)
HGB BLD-MCNC: 14 G/DL (ref 12–15.9)
MAXIMAL PREDICTED HEART RATE: 159 BPM
MCH RBC QN AUTO: 28.2 PG (ref 26.6–33)
MCHC RBC AUTO-ENTMCNC: 29.9 G/DL (ref 31.5–35.7)
MCV RBC AUTO: 94.4 FL (ref 79–97)
PLATELET # BLD AUTO: 154 10*3/MM3 (ref 140–450)
PMV BLD AUTO: 11.7 FL (ref 6–12)
POTASSIUM BLD-SCNC: 4.6 MMOL/L (ref 3.5–5.2)
RBC # BLD AUTO: 4.97 10*6/MM3 (ref 3.77–5.28)
SODIUM BLD-SCNC: 141 MMOL/L (ref 136–145)
STRESS TARGET HR: 135 BPM
WBC NRBC COR # BLD: 5.25 10*3/MM3 (ref 3.4–10.8)

## 2019-07-16 ENCOUNTER — TELEPHONE (OUTPATIENT)
Dept: CARDIOLOGY | Facility: CLINIC | Age: 64
End: 2019-07-16

## 2019-07-16 NOTE — TELEPHONE ENCOUNTER
Echo:  Estimated EF appears to be in the range of greater than 70%.       Keep cath appt on 7/22/19 and follow up on 7/29/19

## 2019-07-17 NOTE — TELEPHONE ENCOUNTER
"Informed pt of her results and she stated that she has questions about clopidogrel. I answered the questions that she had.     She asked if they will ask her for her permission to place stents before it's done during LHC. I advised her that a heart cath is done to see if stents are needed, then stents are placed, if needed. Advised her that they don't ask permission, as it's part of the procedure, if needed. Pt stated \"I don't want foreign things in my body.\" I answered the questions pt had and she was still unsure about if she wants to do LHC.     I advised her to take a day and think about what we've dicussed and call me back tomorrow if she's still unsure. Pt verbalized understanding.   "

## 2019-09-04 ENCOUNTER — OFFICE VISIT (OUTPATIENT)
Dept: CARDIOLOGY | Facility: CLINIC | Age: 64
End: 2019-09-04

## 2019-09-04 VITALS
DIASTOLIC BLOOD PRESSURE: 64 MMHG | HEIGHT: 61 IN | SYSTOLIC BLOOD PRESSURE: 112 MMHG | OXYGEN SATURATION: 93 % | HEART RATE: 100 BPM | WEIGHT: 220.2 LBS | BODY MASS INDEX: 41.57 KG/M2

## 2019-09-04 DIAGNOSIS — R06.02 SHORTNESS OF BREATH: ICD-10-CM

## 2019-09-04 DIAGNOSIS — Z99.89 OSA ON CPAP: ICD-10-CM

## 2019-09-04 DIAGNOSIS — I48.0 PAROXYSMAL ATRIAL FIBRILLATION (HCC): Primary | ICD-10-CM

## 2019-09-04 DIAGNOSIS — E78.5 HYPERLIPIDEMIA, UNSPECIFIED HYPERLIPIDEMIA TYPE: ICD-10-CM

## 2019-09-04 DIAGNOSIS — G47.33 OSA ON CPAP: ICD-10-CM

## 2019-09-04 DIAGNOSIS — I10 ESSENTIAL HYPERTENSION: ICD-10-CM

## 2019-09-04 DIAGNOSIS — R60.9 PERIPHERAL EDEMA: ICD-10-CM

## 2019-09-04 PROCEDURE — 99214 OFFICE O/P EST MOD 30 MIN: CPT | Performed by: NURSE PRACTITIONER

## 2019-09-04 RX ORDER — SPIRONOLACTONE 25 MG/1
25 TABLET ORAL DAILY
Qty: 90 TABLET | Refills: 3 | Status: SHIPPED | OUTPATIENT
Start: 2019-09-04

## 2019-09-04 NOTE — PATIENT INSTRUCTIONS
"Fat and Cholesterol Restricted Eating Plan  Getting too much fat and cholesterol in your diet may cause health problems. Choosing the right foods helps keep your fat and cholesterol at normal levels. This can keep you from getting certain diseases.  Your doctor may recommend an eating plan that includes:  · Total fat: ______% or less of total calories a day.  · Saturated fat: ______% or less of total calories a day.  · Cholesterol: less than _________mg a day.  · Fiber: ______g a day.  What are tips for following this plan?  General tips    · Work with your doctor to lose weight if you need to.  · Avoid:  ? Foods with added sugar.  ? Fried foods.  ? Foods with partially hydrogenated oils.  · Limit alcohol intake to no more than 1 drink a day for nonpregnant women and 2 drinks a day for men. One drink equals 12 oz of beer, 5 oz of wine, or 1½ oz of hard liquor.  Reading food labels  · Check food labels for:  ? Trans fats.  ? Partially hydrogenated oils.  ? Saturated fat (g) in each serving.  ? Cholesterol (mg) in each serving.  ? Fiber (g) in each serving.  · Choose foods with healthy fats, such as:  ? Monounsaturated fats.  ? Polyunsaturated fats.  ? Omega-3 fats.  · Choose grain products that have whole grains. Look for the word \"whole\" as the first word in the ingredient list.  Cooking  · Cook foods using low-fat methods. These include baking, boiling, grilling, and broiling.  · Eat more home-cooked foods. Eat at restaurants and buffets less often.  · Avoid cooking using saturated fats, such as butter, cream, palm oil, palm kernel oil, and coconut oil.  Meal planning    · At meals, divide your plate into four equal parts:  ? Fill one-half of your plate with vegetables and green salads.  ? Fill one-fourth of your plate with whole grains.  ? Fill one-fourth of your plate with low-fat (lean) protein foods.  · Eat fish that is high in omega-3 fats at least two times a week. This includes mackerel, tuna, sardines, and " salmon.  · Eat foods that are high in fiber, such as whole grains, beans, apples, broccoli, carrots, peas, and barley.  Recommended foods  Grains  · Whole grains, such as whole wheat or whole grain breads, crackers, cereals, and pasta. Unsweetened oatmeal, bulgur, barley, quinoa, or brown rice. Corn or whole wheat flour tortillas.  Vegetables  · Fresh or frozen vegetables (raw, steamed, roasted, or grilled). Green salads.  Fruits  · All fresh, canned (in natural juice), or frozen fruits.  Meats and other protein foods  · Ground beef (85% or leaner), grass-fed beef, or beef trimmed of fat. Skinless chicken or turkey. Ground chicken or turkey. Pork trimmed of fat. All fish and seafood. Egg whites. Dried beans, peas, or lentils. Unsalted nuts or seeds. Unsalted canned beans. Nut butters without added sugar or oil.  Dairy  · Low-fat or nonfat dairy products, such as skim or 1% milk, 2% or reduced-fat cheeses, low-fat and fat-free ricotta or cottage cheese, or plain low-fat and nonfat yogurt.  Fats and oils  · Tub margarine without trans fats. Light or reduced-fat mayonnaise and salad dressings. Avocado. Olive, canola, sesame, or safflower oils.  The items listed above may not be a complete list of recommended foods or beverages. Contact your dietitian for more options.  The items listed above may not be a complete list of foods and beverages [you/your child] can eat. Contact a dietitian for more information.  Foods to avoid  Grains  · White bread. White pasta. White rice. Cornbread. Bagels, pastries, and croissants. Crackers and snack foods that contain trans fat and hydrogenated oils.  Vegetables  · Vegetables cooked in cheese, cream, or butter sauce. Fried vegetables.  Fruits  · Canned fruit in heavy syrup. Fruit in cream or butter sauce. Fried fruit.  Meats and other protein foods  · Fatty cuts of meat. Ribs, chicken wings, maldonado, sausage, bologna, salami, chitterlings, fatback, hot dogs, bratwurst, and packaged  lunch meats. Liver and organ meats. Whole eggs and egg yolks. Chicken and turkey with skin. Fried meat.  Dairy  · Whole or 2% milk, cream, half-and-half, and cream cheese. Whole milk cheeses. Whole-fat or sweetened yogurt. Full-fat cheeses. Nondairy creamers and whipped toppings. Processed cheese, cheese spreads, and cheese curds.  Beverages  · Alcohol. Sugar-sweetened drinks such as sodas, lemonade, and fruit drinks.  Fats and oils  · Butter, stick margarine, lard, shortening, ghee, or maldonado fat. Coconut, palm kernel, and palm oils.  Sweets and desserts  · Corn syrup, sugars, honey, and molasses. Candy. Jam and jelly. Syrup. Sweetened cereals. Cookies, pies, cakes, donuts, muffins, and ice cream.  The items listed above may not be a complete list of foods and beverages to avoid. Contact your dietitian for more information.  The items listed above may not be a complete list of foods and beverages [you/your child] should avoid. Contact a dietitian for more information.  Summary  · Choosing the right foods helps keep your fat and cholesterol at normal levels. This can keep you from getting certain diseases.  · At meals, fill one-half of your plate with vegetables and green salads.  · Eat high-fiber foods, like whole grains, beans, apples, carrots, peas, and barley.  · Limit added sugar, saturated fats, alcohol, and fried foods.  This information is not intended to replace advice given to you by your health care provider. Make sure you discuss any questions you have with your health care provider.  Document Released: 06/18/2013 Document Revised: 09/04/2018 Document Reviewed: 09/04/2018  [employer] Interactive Patient Education © 2019 [employer]  BMI for Adults    Body mass index (BMI) is a number that is calculated from a person's weight and height. BMI may help to estimate how much of a person's weight is composed of fat. BMI can help identify those who may be at higher risk for certain medical problems.  How is BMI  "used with adults?  BMI is used as a screening tool to identify possible weight problems. It is used to check whether a person is obese, overweight, healthy weight, or underweight.  How is BMI calculated?  BMI measures your weight and compares it to your height. This can be done either in English ([place] or metric measurements. Note that charts are available to help you find your BMI quickly and easily without having to do these calculations yourself.  To calculate your BMI in English ([place] measurements, your health care provider will:  1. Measure your weight in pounds (lb).  2. Multiply the number of pounds by 703.  ? For example, for a person who weighs 180 lb, multiply that number by 703, which equals 126,540.  3. Measure your height in inches (in). Then multiply that number by itself to get a measurement called \"inches squared.\"  ? For example, for a person who is 70 in tall, the \"inches squared\" measurement is 70 in x 70 in, which equals 4900 inches squared.  4. Divide the total from Step 2 (number of lb x 703) by the total from Step 3 (inches squared): 126,540 ÷ 4900 = 25.8. This is your BMI.  To calculate your BMI in metric measurements, your health care provider will:  1. Measure your weight in kilograms (kg).  2. Measure your height in meters (m). Then multiply that number by itself to get a measurement called \"meters squared.\"  ? For example, for a person who is 1.75 m tall, the \"meters squared\" measurement is 1.75 m x 1.75 m, which is equal to 3.1 meters squared.  3. Divide the number of kilograms (your weight) by the meters squared number. In this example: 70 ÷ 3.1 = 22.6. This is your BMI.  How is BMI interpreted?  To interpret your results, your health care provider will use BMI charts to identify whether you are underweight, normal weight, overweight, or obese. The following guidelines will be used:  · Underweight: BMI less than 18.5.  · Normal weight: BMI between 18.5 and 24.9.  · Overweight: BMI " between 25 and 29.9.  · Obese: BMI of 30 and above.  Please note:  · Weight includes both fat and muscle, so someone with a muscular build, such as an athlete, may have a BMI that is higher than 24.9. In cases like these, BMI is not an accurate measure of body fat.  · To determine if excess body fat is the cause of a BMI of 25 or higher, further assessments may need to be done by a health care provider.  · BMI is usually interpreted in the same way for men and women.  Why is BMI a useful tool?  BMI is useful in two ways:  · Identifying a weight problem that may be related to a medical condition, or that may increase the risk for medical problems.  · Promoting lifestyle and diet changes in order to reach a healthy weight.  Summary  · Body mass index (BMI) is a number that is calculated from a person's weight and height.  · BMI may help to estimate how much of a person's weight is composed of fat. BMI can help identify those who may be at higher risk for certain medical problems.  · BMI can be measured using English measurements or metric measurements.  · To interpret your results, your health care provider will use BMI charts to identify whether you are underweight, normal weight, overweight, or obese.  This information is not intended to replace advice given to you by your health care provider. Make sure you discuss any questions you have with your health care provider.  Document Released: 08/29/2005 Document Revised: 10/31/2018 Document Reviewed: 10/31/2018  [employer] Interactive Patient Education © 2019 [employer]

## 2019-09-04 NOTE — PROGRESS NOTES
Subjective   Christina Holbrook is a 64 y.o. female     Chief Complaint   Patient presents with   • Follow-up   • Atrial Fibrillation       HPI.    Problem List:    1. Persistent Afib (CHADS 2 - Eliquis)  2. Hypertension  2.1 Echo 5/3/16 - EF 60-65%; trace MR; mild TR; PA 58  2.2 Echo 10/6/16 - EF 65%; mild TR; PA 35-45  2.3 Echo 2/22/18-mild LVH, EF greater than 65%, diastolic dysfunction 2, mild to moderate MR, mild TR, mild AR, PA 40-45  3. Pulmonary Hypertension  4. Chronic BLE Edema   4.1 BLE Arterial U/S 5/5/17 - no evidence of sig atheroma obst disease at nay level on either side.   5. H/O CHF   6. Chest Pain  6.1 Stress Test 5/3/16 - small fixed defect in mid anterolateral, apical lateral segments; extent of defect mild to mod; post stress EF 40%  6.2 Stress Test 1/4/17 - no ischemia; low risk  6.3 Stress test 2/22/18-no ischemia, low risk  6.4 stress test 7/9/19-moderate sized, moderately dense ischemic defect involving the distal anterior wall of the anterior apical and apical segments.  There is also lateral wall ischemia.  Preserved post-rest EF 73%  7. DEEPAK - CPAP   8. Carotid Artery Disease   8.1 Carotid Artery U/S 5/5/17 - bilateral bifurcation 16-49%; less than 15% both ICA. Antegrade flow both vertebral arteries  9.  Dyslipidemia    Patient is a 64-year-old female who presents today for a follow-up.  She denies any chest pain, pressure, palpitations, fluttering, dizziness, presyncope, syncope, orthopnea or PND.  She says that she does get swelling but it typically goes down overnight.  She says that she is short of breath but only if she is exercising a lot.  She says that she feels like she is doing well and does not want to proceed with a left heart cath.  She denies any signs of bleeding or cerebral ischemic events.    Current Outpatient Medications   Medication Sig Dispense Refill   • apixaban (ELIQUIS) 5 MG tablet tablet Take 1 tablet by mouth Every 12 (Twelve) Hours. 180 tablet 3   •  Ascorbic Acid (VITAMIN C) 500 MG capsule Take 1,000 mg by mouth Daily.     • aspirin 81 MG tablet Take 1 tablet by mouth Daily. 30 tablet 11   • Biotin 5 MG tablet Take 5 mg by mouth Daily.     • Calcium Carbonate-Vitamin D (CALTRATE 600+D PO) Take  by mouth Daily.     • furosemide (LASIX) 20 MG tablet Take 1 20mg tab, PRN if increased edema over night. 90 tablet 3   • furosemide (LASIX) 40 MG tablet Take 1 tablet by mouth Daily. 90 tablet 3   • lovastatin (MEVACOR) 20 MG tablet Take 1 tablet by mouth Daily With Dinner. 90 tablet 3   • metoprolol tartrate (LOPRESSOR) 25 MG tablet Take 1 tablet by mouth 2 (Two) Times a Day. 180 tablet 3   • Multiple Vitamins-Minerals (MULTIVITAMIN ADULT PO) Take  by mouth Daily.     • nitroglycerin (NITROSTAT) 0.4 MG SL tablet DISSOLVE ONE TABLET UNDER THE TONGUE EVERY 5 MINUTES AS NEEDED FOR CHEST PAIN.  DO NOT EXCEED A TOTAL OF 3 DOSES IN 15 MINUTES 25 tablet 3   • potassium chloride (K-DUR) 10 MEQ CR tablet Take 1 tablet by mouth Daily. 90 tablet 3   • spironolactone (ALDACTONE) 25 MG tablet Take 1 tablet by mouth Daily. 90 tablet 3   • Turmeric powder Daily.     • vitamin B-12 (CYANOCOBALAMIN) 1000 MCG tablet Take 1,000 mcg by mouth Daily.     • Vitamin E 400 units tablet Take 400 Units by mouth Daily.       No current facility-administered medications for this visit.        ALLERGIES    Naproxen    Past Medical History:   Diagnosis Date   • A-fib (CMS/[facility])    • CHF (congestive heart failure) (CMS/[facility])    • Diabetes mellitus (CMS/[facility])    • Disease of thyroid gland    • DVT (deep venous thrombosis) (CMS/[facility])     hx   • Essential hypertension 12/1/2017   • Pulmonary HTN (CMS/HCC)     severe       Social History     Socioeconomic History   • Marital status:      Spouse name: Not on file   • Number of children: Not on file   • Years of education: Not on file   • Highest education level: Not on file   Tobacco Use   • Smoking status: Former Smoker     Packs/day: 1.00     Types:  "Cigarettes     Last attempt to quit:      Years since quittin.6   • Smokeless tobacco: Never Used   Substance and Sexual Activity   • Alcohol use: No   • Drug use: No   • Sexual activity: Defer       Family History   Problem Relation Age of Onset   • Heart disease Father    • Heart disease Sister    • Heart disease Brother        Review of Systems   Constitutional: Negative for fatigue.   HENT: Negative for congestion, rhinorrhea and sore throat.    Eyes: Negative for visual disturbance.   Respiratory: Positive for shortness of breath (if she does exercising a lot ). Negative for chest tightness.    Cardiovascular: Positive for leg swelling (BLE edema, goes down some overnight ). Negative for chest pain (Denies CP ) and palpitations.   Gastrointestinal: Negative for abdominal pain, blood in stool, constipation, diarrhea, nausea and vomiting.   Endocrine: Positive for cold intolerance (back and forth ) and heat intolerance (back and forth ).   Genitourinary: Negative for difficulty urinating, dysuria, frequency, hematuria and urgency.   Musculoskeletal: Negative for arthralgias, back pain and neck pain.   Skin: Negative for rash and wound.   Allergic/Immunologic: Negative for environmental allergies and food allergies.   Neurological: Positive for headaches. Negative for dizziness, syncope, weakness, light-headedness and numbness.   Hematological: Bruises/bleeds easily (bleeds easily ).   Psychiatric/Behavioral: Negative for sleep disturbance.       Objective   /64   Pulse 100   Ht 154.9 cm (61\")   Wt 99.9 kg (220 lb 3.2 oz)   SpO2 93%   BMI 41.61 kg/m²   Vitals:    19 0852   BP: 112/64   Pulse: 100   SpO2: 93%   Weight: 99.9 kg (220 lb 3.2 oz)   Height: 154.9 cm (61\")      Lab Results (most recent)     None        Physical Exam   Constitutional: She is oriented to person, place, and time. Vital signs are normal. She appears well-developed and well-nourished. She is active and cooperative. "   HENT:   Head: Normocephalic.   Eyes: Lids are normal.   Neck: Normal carotid pulses, no hepatojugular reflux and no JVD present. Carotid bruit is not present.   Cardiovascular: Normal rate and regular rhythm.   Murmur heard.   Systolic (RUSB, LUSB & LLSB ) murmur is present with a grade of 2/6.  Pulses:       Radial pulses are 2+ on the right side, and 2+ on the left side.        Dorsalis pedis pulses are 2+ on the right side, and 2+ on the left side.        Posterior tibial pulses are 2+ on the right side, and 2+ on the left side.   1+ edema BLE; PVD Changes BLE.   Pulmonary/Chest: Effort normal and breath sounds normal.   Abdominal: Normal appearance and bowel sounds are normal.   Neurological: She is alert and oriented to person, place, and time.   Skin: Skin is warm, dry and intact.   Psychiatric: She has a normal mood and affect. Her speech is normal and behavior is normal. Judgment and thought content normal. Cognition and memory are normal.       Procedure   Procedures         Assessment/Plan      Diagnosis Plan   1. Paroxysmal atrial fibrillation (CMS/HCC)  apixaban (ELIQUIS) 5 MG tablet tablet   2. Essential hypertension  spironolactone (ALDACTONE) 25 MG tablet   3. Hyperlipidemia, unspecified hyperlipidemia type     4. DEEPAK on CPAP     5. Peripheral edema  spironolactone (ALDACTONE) 25 MG tablet   6. Shortness of breath         Return in about 6 months (around 3/4/2020).       A. fib-patient is on Eliquis and beta-blocker.  Hypertension-patient is doing well on metoprolol and spironolactone.  Hyperlipidemia-patient is on lovastatin monitor by PCP.  DEEPAK-patient is compliant with CPAP.  Peripheral edema-patient is on spironolactone and Lasix.  Shortness of breath-stable.  She will continue her medication regimen.  She will follow-up in 6 months or sooner if any changes.      Patient's Body mass index is 41.61 kg/m². BMI is above normal parameters. Recommendations include: educational  material.      Electronically signed by:

## 2020-04-09 ENCOUNTER — TELEPHONE (OUTPATIENT)
Dept: CARDIOLOGY | Facility: CLINIC | Age: 65
End: 2020-04-09

## 2020-04-09 DIAGNOSIS — I10 ESSENTIAL HYPERTENSION: ICD-10-CM

## 2020-04-09 DIAGNOSIS — R07.2 PRECORDIAL PAIN: ICD-10-CM

## 2020-04-09 RX ORDER — POTASSIUM CHLORIDE 750 MG/1
10 TABLET, FILM COATED, EXTENDED RELEASE ORAL DAILY
Qty: 90 TABLET | Refills: 3 | Status: SHIPPED | OUTPATIENT
Start: 2020-04-09

## 2020-04-09 RX ORDER — NITROGLYCERIN 0.4 MG/1
TABLET SUBLINGUAL
Qty: 25 TABLET | Refills: 3 | Status: SHIPPED | OUTPATIENT
Start: 2020-04-09

## 2020-04-09 NOTE — TELEPHONE ENCOUNTER
Patient called into the office stating she needs refills on her potassium and her nitro. She is currently staying in [place] so she stated [facility] would [facility]    [phone]

## 2020-06-27 ENCOUNTER — APPOINTMENT (OUTPATIENT)
Dept: ULTRASOUND IMAGING | Facility: HOSPITAL | Age: 65
End: 2020-06-27

## 2020-06-27 ENCOUNTER — HOSPITAL ENCOUNTER (EMERGENCY)
Facility: HOSPITAL | Age: 65
Discharge: ANOTHER HOSPITAL | End: 2020-06-27
Attending: FAMILY MEDICINE | Admitting: FAMILY MEDICINE

## 2020-06-27 ENCOUNTER — APPOINTMENT (OUTPATIENT)
Dept: CT IMAGING | Facility: HOSPITAL | Age: 65
End: 2020-06-27

## 2020-06-27 ENCOUNTER — APPOINTMENT (OUTPATIENT)
Dept: GENERAL RADIOLOGY | Facility: HOSPITAL | Age: 65
End: 2020-06-27

## 2020-06-27 VITALS
DIASTOLIC BLOOD PRESSURE: 35 MMHG | HEART RATE: 112 BPM | RESPIRATION RATE: 22 BRPM | TEMPERATURE: 99.7 F | OXYGEN SATURATION: 99 % | BODY MASS INDEX: 55.32 KG/M2 | HEIGHT: 61 IN | WEIGHT: 293 LBS | SYSTOLIC BLOOD PRESSURE: 91 MMHG

## 2020-06-27 DIAGNOSIS — Z97.8 ENDOTRACHEALLY INTUBATED: ICD-10-CM

## 2020-06-27 DIAGNOSIS — I48.91 ATRIAL FIBRILLATION WITH RVR (HCC): ICD-10-CM

## 2020-06-27 DIAGNOSIS — A41.9 SEPSIS, DUE TO UNSPECIFIED ORGANISM, UNSPECIFIED WHETHER ACUTE ORGAN DYSFUNCTION PRESENT (HCC): Primary | ICD-10-CM

## 2020-06-27 DIAGNOSIS — L03.116 CELLULITIS OF LEFT LOWER EXTREMITY: ICD-10-CM

## 2020-06-27 DIAGNOSIS — I95.9 HYPOTENSION, UNSPECIFIED HYPOTENSION TYPE: ICD-10-CM

## 2020-06-27 DIAGNOSIS — J18.9 PNEUMONIA OF BOTH LOWER LOBES DUE TO INFECTIOUS ORGANISM: ICD-10-CM

## 2020-06-27 LAB
A-A DO2: 15.2 MMHG (ref 0–300)
A-A DO2: 341.1 MMHG (ref 0–300)
A-A DO2: 349.2 MMHG (ref 0–300)
A-A DO2: 493.2 MMHG (ref 0–300)
ALBUMIN SERPL-MCNC: 3.26 G/DL (ref 3.5–5.2)
ALBUMIN/GLOB SERPL: 0.8 G/DL
ALP SERPL-CCNC: 135 U/L (ref 39–117)
ALT SERPL W P-5'-P-CCNC: 12 U/L (ref 1–33)
ANION GAP SERPL CALCULATED.3IONS-SCNC: 14 MMOL/L (ref 5–15)
ARTERIAL PATENCY WRIST A: ABNORMAL
ARTERIAL PATENCY WRIST A: ABNORMAL
ARTERIAL PATENCY WRIST A: POSITIVE
ARTERIAL PATENCY WRIST A: POSITIVE
AST SERPL-CCNC: 26 U/L (ref 1–32)
ATMOSPHERIC PRESS: 726 MMHG
ATMOSPHERIC PRESS: 727 MMHG
BACTERIA UR QL AUTO: ABNORMAL /HPF
BASE EXCESS BLDA CALC-SCNC: -1.6 MMOL/L (ref 0–2)
BASE EXCESS BLDA CALC-SCNC: -10.1 MMOL/L (ref 0–2)
BASE EXCESS BLDA CALC-SCNC: -6.3 MMOL/L (ref 0–2)
BASE EXCESS BLDA CALC-SCNC: 2.4 MMOL/L (ref 0–2)
BASOPHILS # BLD AUTO: 0.01 10*3/MM3 (ref 0–0.2)
BASOPHILS NFR BLD AUTO: 0.3 % (ref 0–1.5)
BDY SITE: ABNORMAL
BILIRUB SERPL-MCNC: 2.7 MG/DL (ref 0.2–1.2)
BILIRUB UR QL STRIP: NEGATIVE
BODY TEMPERATURE: 0 C
BUN BLD-MCNC: 32 MG/DL (ref 8–23)
BUN/CREAT SERPL: 29.1 (ref 7–25)
CALCIUM SPEC-SCNC: 9.9 MG/DL (ref 8.6–10.5)
CHLORIDE SERPL-SCNC: 100 MMOL/L (ref 98–107)
CK SERPL-CCNC: 59 U/L (ref 20–180)
CLARITY UR: CLEAR
CO2 BLDA-SCNC: 16.2 MMOL/L (ref 22–33)
CO2 BLDA-SCNC: 24.2 MMOL/L (ref 22–33)
CO2 BLDA-SCNC: 25.3 MMOL/L (ref 22–33)
CO2 BLDA-SCNC: 26.4 MMOL/L (ref 22–33)
CO2 SERPL-SCNC: 25 MMOL/L (ref 22–29)
COHGB MFR BLD: 1.2 % (ref 0–5)
COHGB MFR BLD: 1.5 % (ref 0–5)
COHGB MFR BLD: 1.5 % (ref 0–5)
COHGB MFR BLD: 4.1 % (ref 0–5)
COLOR UR: ABNORMAL
CREAT BLD-MCNC: 1.1 MG/DL (ref 0.57–1)
CRP SERPL-MCNC: 1.22 MG/DL (ref 0–0.5)
D-LACTATE SERPL-SCNC: 4.2 MMOL/L (ref 0.5–2)
D-LACTATE SERPL-SCNC: 5.7 MMOL/L (ref 0.5–2)
DEPRECATED RDW RBC AUTO: 51.5 FL (ref 37–54)
DIGOXIN SERPL-MCNC: <0.3 NG/ML (ref 0.6–1.2)
EOSINOPHIL # BLD AUTO: 0.02 10*3/MM3 (ref 0–0.4)
EOSINOPHIL NFR BLD AUTO: 0.5 % (ref 0.3–6.2)
ERYTHROCYTE [DISTWIDTH] IN BLOOD BY AUTOMATED COUNT: 15.1 % (ref 12.3–15.4)
GAS FLOW AIRWAY: 15 LPM
GFR SERPL CREATININE-BSD FRML MDRD: 50 ML/MIN/1.73
GLOBULIN UR ELPH-MCNC: 4.3 GM/DL
GLUCOSE BLD-MCNC: 109 MG/DL (ref 65–99)
GLUCOSE UR STRIP-MCNC: NEGATIVE MG/DL
HCO3 BLDA-SCNC: 15.2 MMOL/L (ref 20–26)
HCO3 BLDA-SCNC: 22.4 MMOL/L (ref 20–26)
HCO3 BLDA-SCNC: 24 MMOL/L (ref 20–26)
HCO3 BLDA-SCNC: 25.4 MMOL/L (ref 20–26)
HCT VFR BLD AUTO: 36 % (ref 34–46.6)
HCT VFR BLD CALC: 34.3 % (ref 38–51)
HCT VFR BLD CALC: 35.1 % (ref 38–51)
HCT VFR BLD CALC: 35.2 % (ref 38–51)
HCT VFR BLD CALC: 35.5 % (ref 38–51)
HGB BLD-MCNC: 11.2 G/DL (ref 12–15.9)
HGB BLDA-MCNC: 11.2 G/DL (ref 13.5–17.5)
HGB BLDA-MCNC: 11.5 G/DL (ref 13.5–17.5)
HGB BLDA-MCNC: 11.5 G/DL (ref 13.5–17.5)
HGB BLDA-MCNC: 11.6 G/DL (ref 13.5–17.5)
HGB UR QL STRIP.AUTO: NEGATIVE
HOROWITZ INDEX BLD+IHG-RTO: 100 %
HOROWITZ INDEX BLD+IHG-RTO: 100 %
HOROWITZ INDEX BLD+IHG-RTO: 21 %
HOROWITZ INDEX BLD+IHG-RTO: 70 %
HYALINE CASTS UR QL AUTO: ABNORMAL /LPF
IMM GRANULOCYTES # BLD AUTO: 0.01 10*3/MM3 (ref 0–0.05)
IMM GRANULOCYTES NFR BLD AUTO: 0.3 % (ref 0–0.5)
KETONES UR QL STRIP: NEGATIVE
LACTATE HOLD SPECIMEN: NORMAL
LEUKOCYTE ESTERASE UR QL STRIP.AUTO: NEGATIVE
LYMPHOCYTES # BLD AUTO: 0.33 10*3/MM3 (ref 0.7–3.1)
LYMPHOCYTES NFR BLD AUTO: 8.6 % (ref 19.6–45.3)
Lab: ABNORMAL
MAGNESIUM SERPL-MCNC: 1.4 MG/DL (ref 1.6–2.4)
MCH RBC QN AUTO: 29.6 PG (ref 26.6–33)
MCHC RBC AUTO-ENTMCNC: 31.1 G/DL (ref 31.5–35.7)
MCV RBC AUTO: 95 FL (ref 79–97)
METHGB BLD QL: 0 % (ref 0–3)
METHGB BLD QL: 0.2 % (ref 0–3)
METHGB BLD QL: 0.6 % (ref 0–3)
METHGB BLD QL: <-0.1 % (ref 0–3)
MODALITY: ABNORMAL
MONOCYTES # BLD AUTO: 0.06 10*3/MM3 (ref 0.1–0.9)
MONOCYTES NFR BLD AUTO: 1.6 % (ref 5–12)
NEUTROPHILS # BLD AUTO: 3.42 10*3/MM3 (ref 1.7–7)
NEUTROPHILS NFR BLD AUTO: 88.7 % (ref 42.7–76)
NITRITE UR QL STRIP: NEGATIVE
NOTE: ABNORMAL
NOTIFIED BY: ABNORMAL
NOTIFIED WHO: ABNORMAL
NRBC BLD AUTO-RTO: 0 /100 WBC (ref 0–0.2)
NT-PROBNP SERPL-MCNC: 4447 PG/ML (ref 5–900)
OXYHGB MFR BLDV: 85.4 % (ref 94–99)
OXYHGB MFR BLDV: 91.7 % (ref 94–99)
OXYHGB MFR BLDV: 98.7 % (ref 94–99)
OXYHGB MFR BLDV: 99 % (ref 94–99)
PCO2 BLDA: 31.1 MM HG (ref 35–45)
PCO2 BLDA: 33.1 MM HG (ref 35–45)
PCO2 BLDA: 42.8 MM HG (ref 35–45)
PCO2 BLDA: 58.8 MM HG (ref 35–45)
PCO2 TEMP ADJ BLD: ABNORMAL MM[HG]
PEEP RESPIRATORY: 8 CM[H2O]
PEEP RESPIRATORY: 8 CM[H2O]
PH BLDA: 7.19 PH UNITS (ref 7.35–7.45)
PH BLDA: 7.3 PH UNITS (ref 7.35–7.45)
PH BLDA: 7.36 PH UNITS (ref 7.35–7.45)
PH BLDA: 7.49 PH UNITS (ref 7.35–7.45)
PH UR STRIP.AUTO: <=5 [PH] (ref 5–8)
PH, TEMP CORRECTED: ABNORMAL
PLATELET # BLD AUTO: 97 10*3/MM3 (ref 140–450)
PMV BLD AUTO: 10.9 FL (ref 6–12)
PO2 BLDA: 153 MM HG (ref 83–108)
PO2 BLDA: 295 MM HG (ref 83–108)
PO2 BLDA: 64.6 MM HG (ref 83–108)
PO2 BLDA: 92.3 MM HG (ref 83–108)
PO2 TEMP ADJ BLD: ABNORMAL MM[HG]
POTASSIUM BLD-SCNC: 3.6 MMOL/L (ref 3.5–5.2)
PROT SERPL-MCNC: 7.6 G/DL (ref 6–8.5)
PROT UR QL STRIP: ABNORMAL
RBC # BLD AUTO: 3.79 10*6/MM3 (ref 3.77–5.28)
RBC # UR: ABNORMAL /HPF
REF LAB TEST METHOD: ABNORMAL
SAO2 % BLDCOA: 86.4 % (ref 94–99)
SAO2 % BLDCOA: 96.2 % (ref 94–99)
SAO2 % BLDCOA: >99.2 % (ref 94–99)
SAO2 % BLDCOA: >99.2 % (ref 94–99)
SARS-COV-2 RDRP RESP QL NAA+PROBE: NOT DETECTED
SET MECH RESP RATE: 18
SET MECH RESP RATE: 18
SODIUM BLD-SCNC: 139 MMOL/L (ref 136–145)
SP GR UR STRIP: 1.01 (ref 1–1.03)
SQUAMOUS #/AREA URNS HPF: ABNORMAL /HPF
TROPONIN T SERPL-MCNC: 0.26 NG/ML (ref 0–0.03)
TROPONIN T SERPL-MCNC: 1.28 NG/ML (ref 0–0.03)
TSH SERPL DL<=0.05 MIU/L-ACNC: <0.005 UIU/ML (ref 0.27–4.2)
UROBILINOGEN UR QL STRIP: ABNORMAL
VENTILATOR MODE: ABNORMAL
VT ON VENT VENT: 500 ML
VT ON VENT VENT: 500 ML
WBC NRBC COR # BLD: 3.85 10*3/MM3 (ref 3.4–10.8)
WBC UR QL AUTO: ABNORMAL /HPF

## 2020-06-27 PROCEDURE — 25010000002 PIPERACILLIN SOD-TAZOBACTAM PER 1 G: Performed by: FAMILY MEDICINE

## 2020-06-27 PROCEDURE — 83605 ASSAY OF LACTIC ACID: CPT | Performed by: FAMILY MEDICINE

## 2020-06-27 PROCEDURE — 84484 ASSAY OF TROPONIN QUANT: CPT | Performed by: FAMILY MEDICINE

## 2020-06-27 PROCEDURE — 83735 ASSAY OF MAGNESIUM: CPT | Performed by: FAMILY MEDICINE

## 2020-06-27 PROCEDURE — 83880 ASSAY OF NATRIURETIC PEPTIDE: CPT | Performed by: FAMILY MEDICINE

## 2020-06-27 PROCEDURE — 81001 URINALYSIS AUTO W/SCOPE: CPT | Performed by: FAMILY MEDICINE

## 2020-06-27 PROCEDURE — 82375 ASSAY CARBOXYHB QUANT: CPT

## 2020-06-27 PROCEDURE — 96376 TX/PRO/DX INJ SAME DRUG ADON: CPT

## 2020-06-27 PROCEDURE — 87635 SARS-COV-2 COVID-19 AMP PRB: CPT | Performed by: PHYSICIAN ASSISTANT

## 2020-06-27 PROCEDURE — 99292 CRITICAL CARE ADDL 30 MIN: CPT

## 2020-06-27 PROCEDURE — 94799 UNLISTED PULMONARY SVC/PX: CPT

## 2020-06-27 PROCEDURE — 25010000002 ONDANSETRON PER 1 MG: Performed by: FAMILY MEDICINE

## 2020-06-27 PROCEDURE — 87150 DNA/RNA AMPLIFIED PROBE: CPT | Performed by: FAMILY MEDICINE

## 2020-06-27 PROCEDURE — 71250 CT THORAX DX C-: CPT

## 2020-06-27 PROCEDURE — C1751 CATH, INF, PER/CENT/MIDLINE: HCPCS

## 2020-06-27 PROCEDURE — 87040 BLOOD CULTURE FOR BACTERIA: CPT | Performed by: FAMILY MEDICINE

## 2020-06-27 PROCEDURE — 80053 COMPREHEN METABOLIC PANEL: CPT | Performed by: FAMILY MEDICINE

## 2020-06-27 PROCEDURE — 86140 C-REACTIVE PROTEIN: CPT | Performed by: FAMILY MEDICINE

## 2020-06-27 PROCEDURE — 31500 INSERT EMERGENCY AIRWAY: CPT

## 2020-06-27 PROCEDURE — 87077 CULTURE AEROBIC IDENTIFY: CPT | Performed by: FAMILY MEDICINE

## 2020-06-27 PROCEDURE — 80162 ASSAY OF DIGOXIN TOTAL: CPT | Performed by: FAMILY MEDICINE

## 2020-06-27 PROCEDURE — 25010000002 PROPOFOL 10 MG/ML EMULSION: Performed by: FAMILY MEDICINE

## 2020-06-27 PROCEDURE — 83050 HGB METHEMOGLOBIN QUAN: CPT

## 2020-06-27 PROCEDURE — 25010000002 DOPAMINE PER 40 MG: Performed by: FAMILY MEDICINE

## 2020-06-27 PROCEDURE — 25010000003 LIDOCAINE 1 % SOLUTION

## 2020-06-27 PROCEDURE — 93971 EXTREMITY STUDY: CPT

## 2020-06-27 PROCEDURE — 36600 WITHDRAWAL OF ARTERIAL BLOOD: CPT

## 2020-06-27 PROCEDURE — 25010000002 VANCOMYCIN 5 G RECONSTITUTED SOLUTION: Performed by: FAMILY MEDICINE

## 2020-06-27 PROCEDURE — 25010000002 PHENYLEPHRINE 10 MG/ML SOLUTION: Performed by: FAMILY MEDICINE

## 2020-06-27 PROCEDURE — 84443 ASSAY THYROID STIM HORMONE: CPT | Performed by: FAMILY MEDICINE

## 2020-06-27 PROCEDURE — 93010 ELECTROCARDIOGRAM REPORT: CPT | Performed by: INTERNAL MEDICINE

## 2020-06-27 PROCEDURE — 87186 SC STD MICRODIL/AGAR DIL: CPT | Performed by: FAMILY MEDICINE

## 2020-06-27 PROCEDURE — 71045 X-RAY EXAM CHEST 1 VIEW: CPT

## 2020-06-27 PROCEDURE — 25010000002 MIDAZOLAM PER 1 MG: Performed by: FAMILY MEDICINE

## 2020-06-27 PROCEDURE — 94002 VENT MGMT INPAT INIT DAY: CPT

## 2020-06-27 PROCEDURE — 25010000002 AMIODARONE IN DEXTROSE 5% 360-4.14 MG/200ML-% SOLUTION: Performed by: FAMILY MEDICINE

## 2020-06-27 PROCEDURE — 96365 THER/PROPH/DIAG IV INF INIT: CPT

## 2020-06-27 PROCEDURE — 82805 BLOOD GASES W/O2 SATURATION: CPT

## 2020-06-27 PROCEDURE — 96375 TX/PRO/DX INJ NEW DRUG ADDON: CPT

## 2020-06-27 PROCEDURE — 96366 THER/PROPH/DIAG IV INF ADDON: CPT

## 2020-06-27 PROCEDURE — 99291 CRITICAL CARE FIRST HOUR: CPT

## 2020-06-27 PROCEDURE — 85025 COMPLETE CBC W/AUTO DIFF WBC: CPT | Performed by: FAMILY MEDICINE

## 2020-06-27 PROCEDURE — 25010000002 AMIODARONE PER 30 MG: Performed by: FAMILY MEDICINE

## 2020-06-27 PROCEDURE — 96368 THER/DIAG CONCURRENT INF: CPT

## 2020-06-27 PROCEDURE — 82550 ASSAY OF CK (CPK): CPT | Performed by: FAMILY MEDICINE

## 2020-06-27 PROCEDURE — 93005 ELECTROCARDIOGRAM TRACING: CPT | Performed by: FAMILY MEDICINE

## 2020-06-27 PROCEDURE — 25010000002 DIGOXIN PER 500 MCG: Performed by: FAMILY MEDICINE

## 2020-06-27 RX ORDER — PHENYLEPHRINE HCL IN 0.9% NACL 0.5 MG/5ML
.5-3 SYRINGE (ML) INTRAVENOUS
Status: DISCONTINUED | OUTPATIENT
Start: 2020-06-27 | End: 2020-06-28 | Stop reason: HOSPADM

## 2020-06-27 RX ORDER — DIGOXIN 0.25 MG/ML
250 INJECTION INTRAMUSCULAR; INTRAVENOUS ONCE
Status: COMPLETED | OUTPATIENT
Start: 2020-06-27 | End: 2020-06-27

## 2020-06-27 RX ORDER — FENTANYL CITRATE/PF 100MCG/2ML
SYRINGE (ML) INTRAVENOUS
Status: DISCONTINUED | OUTPATIENT
Start: 2020-06-27 | End: 2020-06-28 | Stop reason: HOSPADM

## 2020-06-27 RX ORDER — MIDAZOLAM HCL IN 0.9 % NACL/PF 1 MG/ML
1-10 PLASTIC BAG, INJECTION (ML) INTRAVENOUS
Status: DISCONTINUED | OUTPATIENT
Start: 2020-06-27 | End: 2020-06-28 | Stop reason: HOSPADM

## 2020-06-27 RX ORDER — NOREPINEPHRINE BIT/0.9 % NACL 8 MG/250ML
.02-.3 INFUSION BOTTLE (ML) INTRAVENOUS
Status: DISCONTINUED | OUTPATIENT
Start: 2020-06-27 | End: 2020-06-28 | Stop reason: HOSPADM

## 2020-06-27 RX ORDER — BUMETANIDE 0.25 MG/ML
1 INJECTION INTRAMUSCULAR; INTRAVENOUS ONCE
Status: DISCONTINUED | OUTPATIENT
Start: 2020-06-27 | End: 2020-06-28 | Stop reason: HOSPADM

## 2020-06-27 RX ORDER — FENTANYL CITRATE 50 UG/ML
INJECTION, SOLUTION INTRAMUSCULAR; INTRAVENOUS
Status: DISCONTINUED
Start: 2020-06-27 | End: 2020-06-27 | Stop reason: WASHOUT

## 2020-06-27 RX ORDER — ACETAMINOPHEN 650 MG/1
650 SUPPOSITORY RECTAL ONCE
Status: COMPLETED | OUTPATIENT
Start: 2020-06-27 | End: 2020-06-27

## 2020-06-27 RX ORDER — MIDAZOLAM HYDROCHLORIDE 1 MG/ML
4 INJECTION INTRAMUSCULAR; INTRAVENOUS ONCE
Status: COMPLETED | OUTPATIENT
Start: 2020-06-27 | End: 2020-06-27

## 2020-06-27 RX ORDER — DOPAMINE HYDROCHLORIDE 160 MG/100ML
2-20 INJECTION, SOLUTION INTRAVENOUS
Status: DISCONTINUED | OUTPATIENT
Start: 2020-06-27 | End: 2020-06-28 | Stop reason: HOSPADM

## 2020-06-27 RX ORDER — PROPOFOL 10 MG/ML
40 VIAL (ML) INTRAVENOUS ONCE
Status: COMPLETED | OUTPATIENT
Start: 2020-06-27 | End: 2020-06-27

## 2020-06-27 RX ORDER — ONDANSETRON 2 MG/ML
4 INJECTION INTRAMUSCULAR; INTRAVENOUS ONCE
Status: COMPLETED | OUTPATIENT
Start: 2020-06-27 | End: 2020-06-27

## 2020-06-27 RX ORDER — ETOMIDATE 2 MG/ML
20 INJECTION INTRAVENOUS ONCE
Status: COMPLETED | OUTPATIENT
Start: 2020-06-27 | End: 2020-06-27

## 2020-06-27 RX ORDER — DILTIAZEM HYDROCHLORIDE 5 MG/ML
10 INJECTION INTRAVENOUS ONCE
Status: COMPLETED | OUTPATIENT
Start: 2020-06-27 | End: 2020-06-27

## 2020-06-27 RX ORDER — SODIUM CHLORIDE 0.9 % (FLUSH) 0.9 %
10 SYRINGE (ML) INJECTION AS NEEDED
Status: DISCONTINUED | OUTPATIENT
Start: 2020-06-27 | End: 2020-06-28 | Stop reason: HOSPADM

## 2020-06-27 RX ADMIN — AMIODARONE HYDROCHLORIDE 150 MG: 50 INJECTION, SOLUTION INTRAVENOUS at 16:51

## 2020-06-27 RX ADMIN — PHENYLEPHRINE HYDROCHLORIDE 0.5 MCG/KG/MIN: 10 INJECTION INTRAVENOUS at 22:03

## 2020-06-27 RX ADMIN — ONDANSETRON 4 MG: 2 INJECTION INTRAMUSCULAR; INTRAVENOUS at 16:59

## 2020-06-27 RX ADMIN — SODIUM CHLORIDE 500 ML: 9 INJECTION, SOLUTION INTRAVENOUS at 22:07

## 2020-06-27 RX ADMIN — PROPOFOL 40 MG: 10 INJECTION, EMULSION INTRAVENOUS at 18:20

## 2020-06-27 RX ADMIN — PIPERACILLIN SODIUM AND TAZOBACTAM SODIUM 3.38 G: 3; .375 INJECTION, POWDER, LYOPHILIZED, FOR SOLUTION INTRAVENOUS at 20:35

## 2020-06-27 RX ADMIN — Medication: at 18:30

## 2020-06-27 RX ADMIN — MIDAZOLAM HYDROCHLORIDE 2 MG: 1 INJECTION, SOLUTION INTRAMUSCULAR; INTRAVENOUS at 17:33

## 2020-06-27 RX ADMIN — SODIUM CHLORIDE 1000 ML: 9 INJECTION, SOLUTION INTRAVENOUS at 22:42

## 2020-06-27 RX ADMIN — ETOMIDATE 20 MG: 2 INJECTION, SOLUTION INTRAVENOUS at 17:47

## 2020-06-27 RX ADMIN — NOREPINEPHRINE BITARTRATE 0.02 MCG/KG/MIN: 1 INJECTION, SOLUTION, CONCENTRATE INTRAVENOUS at 17:00

## 2020-06-27 RX ADMIN — AMIODARONE HYDROCHLORIDE 0.5 MG/MIN: 1.8 INJECTION, SOLUTION INTRAVENOUS at 17:03

## 2020-06-27 RX ADMIN — DOPAMINE HYDROCHLORIDE IN DEXTROSE 4 MCG/KG/MIN: 1.6 INJECTION, SOLUTION INTRAVENOUS at 23:07

## 2020-06-27 RX ADMIN — ACETAMINOPHEN 650 MG: 650 SUPPOSITORY RECTAL at 20:14

## 2020-06-27 RX ADMIN — VANCOMYCIN HYDROCHLORIDE 2000 MG: 5 INJECTION, POWDER, LYOPHILIZED, FOR SOLUTION INTRAVENOUS at 21:47

## 2020-06-27 RX ADMIN — DILTIAZEM HYDROCHLORIDE 10 MG: 5 INJECTION INTRAVENOUS at 16:25

## 2020-06-27 RX ADMIN — ETOMIDATE 20 MG: 2 INJECTION, SOLUTION INTRAVENOUS at 17:35

## 2020-06-27 RX ADMIN — DIGOXIN 250 MCG: 0.25 INJECTION INTRAMUSCULAR; INTRAVENOUS at 20:00

## 2020-06-27 RX ADMIN — NOREPINEPHRINE BITARTRATE 0.06 MCG/KG/MIN: 1 INJECTION, SOLUTION, CONCENTRATE INTRAVENOUS at 17:08

## 2020-06-27 RX ADMIN — Medication 1 MG/HR: at 18:25

## 2020-06-27 RX ADMIN — VASOPRESSIN 0.03 UNITS/MIN: 20 INJECTION INTRAVENOUS at 21:10

## 2020-06-28 LAB — BACTERIA BLD CULT: ABNORMAL

## 2020-06-30 LAB
BACTERIA SPEC AEROBE CULT: ABNORMAL
BACTERIA SPEC AEROBE CULT: ABNORMAL
GRAM STN SPEC: ABNORMAL
ISOLATED FROM: ABNORMAL
ISOLATED FROM: ABNORMAL

## 2020-07-07 ENCOUNTER — TELEPHONE (OUTPATIENT)
Dept: CARDIOLOGY | Facility: CLINIC | Age: 65
End: 2020-07-07

## 2021-02-22 DIAGNOSIS — Z23 IMMUNIZATION DUE: ICD-10-CM

## 2024-07-01 LAB
MAXIMAL PREDICTED HEART RATE: 161 BPM
STRESS TARGET HR: 137 BPM